# Patient Record
Sex: MALE | Race: WHITE | Employment: OTHER | URBAN - METROPOLITAN AREA
[De-identification: names, ages, dates, MRNs, and addresses within clinical notes are randomized per-mention and may not be internally consistent; named-entity substitution may affect disease eponyms.]

---

## 2017-01-05 ENCOUNTER — OFFICE VISIT (OUTPATIENT)
Dept: SURGERY | Facility: CLINIC | Age: 82
End: 2017-01-05

## 2017-01-05 VITALS — HEIGHT: 73 IN | WEIGHT: 205 LBS | BODY MASS INDEX: 27.17 KG/M2

## 2017-01-05 DIAGNOSIS — R59.9 LYMPH NODE ENLARGEMENT: ICD-10-CM

## 2017-01-05 DIAGNOSIS — L72.3 SEBACEOUS CYST OF LEFT AXILLA: Primary | ICD-10-CM

## 2017-01-05 PROCEDURE — 99024 POSTOP FOLLOW-UP VISIT: CPT | Performed by: PHYSICIAN ASSISTANT

## 2017-01-05 NOTE — PROGRESS NOTES
Follow Up Visit Note       Active Problems      1. Sebaceous cyst of left axilla    2.  Lymph node enlargement          Chief Complaint   Lymph Node    History of Present Illness  This patient is doing well following excision of 2 separate lesions within th Social History Main Topics    Smoking Status: Never Smoker                      Alcohol Use: No              Drug Use: No            Other Topics            Concern  Caffeine Concern        No  Exercise                Yes         Current Outpat and unexpected weight change. HENT: Negative for hearing loss, nosebleeds, sore throat and trouble swallowing. Respiratory: Negative for apnea, cough, shortness of breath and wheezing.     Cardiovascular: Negative for chest pain, palpitations and leg s that they should keep the area covered with a dry gauze bandage until the area has scabbed over. I have no further follow-up scheduled with this patient at this time. This patient can see me on an as-needed basis.  This patient should return urgently for

## 2017-06-06 ENCOUNTER — PRIOR ORIGINAL RECORDS (OUTPATIENT)
Dept: OTHER | Age: 82
End: 2017-06-06

## 2017-09-05 ENCOUNTER — OFFICE VISIT (OUTPATIENT)
Dept: NEPHROLOGY | Facility: CLINIC | Age: 82
End: 2017-09-05

## 2017-09-05 VITALS — SYSTOLIC BLOOD PRESSURE: 102 MMHG | DIASTOLIC BLOOD PRESSURE: 64 MMHG

## 2017-09-05 DIAGNOSIS — I10 ESSENTIAL HYPERTENSION: ICD-10-CM

## 2017-09-05 DIAGNOSIS — N28.1 BILATERAL RENAL CYSTS: Primary | ICD-10-CM

## 2017-09-05 PROCEDURE — 99203 OFFICE O/P NEW LOW 30 MIN: CPT | Performed by: INTERNAL MEDICINE

## 2017-09-05 RX ORDER — FINASTERIDE 5 MG/1
5 TABLET, FILM COATED ORAL DAILY
COMMUNITY

## 2017-09-05 RX ORDER — TORSEMIDE 20 MG/1
40 TABLET ORAL DAILY
COMMUNITY

## 2017-09-05 NOTE — PROGRESS NOTES
Consult Note      REASON FOR CONSULT:  Renal cysts         HPI:   Davie Olszewski is a 80year old male with Patient presents with:  Renal Cyst    MD Rylie Cheek was seen in the nephrology clinic today in consultation for management post stroke         PSH:  Past Surgical History:  No date: CARDIAC PACEMAKER PLACEMENT      Comment: [employer]  No date: CATARACT  08/10/2017: OTHER SURGICAL HISTORY      Comment: cystoscopy dr hernandez      Medications (Active prior to today's visit): Alcohol use:  No              Drug use: No            Other Topics            Concern  Caffeine Concern        No  Exercise                Yes           Family History:  No family history on file MALBCREACALC    Lab Results  Component Value Date   COLORUR Straw 11/24/2015   CLARITY Clear 11/24/2015   SPECGRAVITY 1.010 11/24/2015   GLUUR Negative 11/24/2015   BILUR Negative 11/24/2015   KETUR Negative 11/24/2015   BLOODURINE Negative 11/24/2015   PH

## 2018-06-05 ENCOUNTER — PRIOR ORIGINAL RECORDS (OUTPATIENT)
Dept: OTHER | Age: 83
End: 2018-06-05

## 2018-06-05 ENCOUNTER — MYAURORA ACCOUNT LINK (OUTPATIENT)
Dept: OTHER | Age: 83
End: 2018-06-05

## 2018-09-26 ENCOUNTER — MYAURORA ACCOUNT LINK (OUTPATIENT)
Dept: OTHER | Age: 83
End: 2018-09-26

## 2018-09-26 ENCOUNTER — PRIOR ORIGINAL RECORDS (OUTPATIENT)
Dept: OTHER | Age: 83
End: 2018-09-26

## 2018-09-26 LAB — INR: 0

## 2018-09-28 ENCOUNTER — PRIOR ORIGINAL RECORDS (OUTPATIENT)
Dept: OTHER | Age: 83
End: 2018-09-28

## 2018-09-28 LAB — INR: 1.3

## 2018-10-02 ENCOUNTER — PRIOR ORIGINAL RECORDS (OUTPATIENT)
Dept: OTHER | Age: 83
End: 2018-10-02

## 2018-10-02 LAB — INR: 4.3

## 2018-10-04 ENCOUNTER — PRIOR ORIGINAL RECORDS (OUTPATIENT)
Dept: OTHER | Age: 83
End: 2018-10-04

## 2018-10-04 LAB — INR: 2.9

## 2018-12-18 ENCOUNTER — MYAURORA ACCOUNT LINK (OUTPATIENT)
Dept: OTHER | Age: 83
End: 2018-12-18

## 2019-01-01 ENCOUNTER — APPOINTMENT (OUTPATIENT)
Dept: GENERAL RADIOLOGY | Facility: HOSPITAL | Age: 84
DRG: 177 | End: 2019-01-01
Attending: STUDENT IN AN ORGANIZED HEALTH CARE EDUCATION/TRAINING PROGRAM
Payer: MEDICARE

## 2019-01-01 ENCOUNTER — HOSPITAL ENCOUNTER (INPATIENT)
Facility: HOSPITAL | Age: 84
LOS: 9 days | Discharge: NURSING, REHAB OR LONG-TERM CARE | DRG: 177 | End: 2019-01-10
Attending: STUDENT IN AN ORGANIZED HEALTH CARE EDUCATION/TRAINING PROGRAM | Admitting: INTERNAL MEDICINE
Payer: MEDICARE

## 2019-01-01 ENCOUNTER — APPOINTMENT (OUTPATIENT)
Dept: GENERAL RADIOLOGY | Facility: HOSPITAL | Age: 84
DRG: 177 | End: 2019-01-01
Attending: INTERNAL MEDICINE
Payer: MEDICARE

## 2019-01-01 ENCOUNTER — EXTERNAL RECORD (OUTPATIENT)
Dept: HEALTH INFORMATION MANAGEMENT | Facility: OTHER | Age: 84
End: 2019-01-01

## 2019-01-01 DIAGNOSIS — G93.40 ENCEPHALOPATHY ACUTE: ICD-10-CM

## 2019-01-01 DIAGNOSIS — J18.9 NOSOCOMIAL PNEUMONIA: Primary | ICD-10-CM

## 2019-01-01 DIAGNOSIS — R77.8 ELEVATED TROPONIN: ICD-10-CM
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
## 2019-01-01 LAB
ADENOVIRUS PCR:: NEGATIVE
ALBUMIN SERPL-MCNC: 3 G/DL (ref 3.1–4.5)
ALBUMIN/GLOB SERPL: 0.8 {RATIO} (ref 1–2)
ALP LIVER SERPL-CCNC: 55 U/L (ref 45–117)
ALT SERPL-CCNC: 20 U/L (ref 17–63)
ANION GAP SERPL CALC-SCNC: 10 MMOL/L (ref 0–18)
APTT PPP: 28.1 SECONDS (ref 26.1–34.6)
AST SERPL-CCNC: 32 U/L (ref 15–41)
B PERT DNA SPEC QL NAA+PROBE: NEGATIVE
BAND %: 10 %
BASOPHIL % MANUAL: 0 %
BASOPHIL ABSOLUTE MANUAL: 0 X10(3) UL (ref 0–0.1)
BILIRUB SERPL-MCNC: 0.5 MG/DL (ref 0.1–2)
BILIRUB UR QL STRIP.AUTO: NEGATIVE
BUN BLD-MCNC: 26 MG/DL (ref 8–20)
BUN/CREAT SERPL: 12.2 (ref 10–20)
C PNEUM DNA SPEC QL NAA+PROBE: NEGATIVE
CALCIUM BLD-MCNC: 7.9 MG/DL (ref 8.3–10.3)
CHLORIDE SERPL-SCNC: 110 MMOL/L (ref 101–111)
CO2 SERPL-SCNC: 27 MMOL/L (ref 22–32)
CORONAVIRUS 229E PCR:: NEGATIVE
CORONAVIRUS HKU1 PCR:: NEGATIVE
CORONAVIRUS NL63 PCR:: NEGATIVE
CORONAVIRUS OC43 PCR:: NEGATIVE
CREAT BLD-MCNC: 2.13 MG/DL (ref 0.7–1.3)
EOSINOPHIL % MANUAL: 0 %
EOSINOPHIL ABSOLUTE MANUAL: 0 X10(3) UL (ref 0–0.3)
ERYTHROCYTE [DISTWIDTH] IN BLOOD BY AUTOMATED COUNT: 14.3 % (ref 11.5–16)
FLUAV RNA SPEC QL NAA+PROBE: NEGATIVE
FLUBV RNA SPEC QL NAA+PROBE: NEGATIVE
GLOBULIN PLAS-MCNC: 4 G/DL (ref 2.8–4.4)
GLUCOSE BLD-MCNC: 142 MG/DL (ref 70–99)
GLUCOSE UR STRIP.AUTO-MCNC: NEGATIVE MG/DL
HCT VFR BLD AUTO: 43.8 % (ref 37–53)
HGB BLD-MCNC: 14.1 G/DL (ref 13–17)
INR BLD: 2.58 (ref 0.9–1.1)
LACTIC ACID: 1.9 MMOL/L (ref 0.5–2)
LEUKOCYTE ESTERASE UR QL STRIP.AUTO: NEGATIVE
LYMPHOCYTE % MANUAL: 5 %
LYMPHOCYTE ABSOLUTE MANUAL: 0.76 X10(3) UL (ref 0.9–4)
M PROTEIN MFR SERPL ELPH: 7 G/DL (ref 6.4–8.2)
MCH RBC QN AUTO: 30.3 PG (ref 27–33.2)
MCHC RBC AUTO-ENTMCNC: 32.2 G/DL (ref 31–37)
MCV RBC AUTO: 94.2 FL (ref 80–99)
METAMYELOCYTE %: 1 %
METAMYELOCYTE ABSOLUTE MANUAL: 0.15 X10(3) UL (ref ?–0.01)
METAPNEUMOVIRUS PCR:: NEGATIVE
MONOCYTE % MANUAL: 8 %
MONOCYTE ABSOLUTE MANUAL: 1.22 X10(3) UL (ref 0.1–1)
MORPHOLOGY: NORMAL
MYCOPLASMA PNEUMONIA PCR:: NEGATIVE
MYELOCYTE %: 1 %
MYELOCYTE ABSOLUTE MANUAL: 0.15 X10(3) UL (ref ?–0.01)
NEUTROPHIL ABS PRELIM: 13.05 X10 (3) UL (ref 1.3–6.7)
NEUTROPHIL ABSOLUTE MANUAL: 12.92 X10(3) UL (ref 1.3–6.7)
NEUTROPHILS % MANUAL: 75 %
NITRITE UR QL STRIP.AUTO: NEGATIVE
OSMOLALITY SERPL CALC.SUM OF ELEC: 311 MOSM/KG (ref 275–295)
PARAINFLUENZA 1 PCR:: NEGATIVE
PARAINFLUENZA 2 PCR:: NEGATIVE
PARAINFLUENZA 3 PCR:: NEGATIVE
PARAINFLUENZA 4 PCR:: NEGATIVE
PH UR STRIP.AUTO: 5 [PH] (ref 4.5–8)
PLATELET # BLD AUTO: 165 10(3)UL (ref 150–450)
PLATELET MORPHOLOGY: NORMAL
POTASSIUM SERPL-SCNC: 4.4 MMOL/L (ref 3.6–5.1)
PRO-BETA NATRIURETIC PEPTIDE: 1805 PG/ML (ref ?–450)
PROT UR STRIP.AUTO-MCNC: NEGATIVE MG/DL
PSA SERPL DL<=0.01 NG/ML-MCNC: 28.5 SECONDS (ref 12.4–14.7)
RBC # BLD AUTO: 4.65 X10(6)UL (ref 3.8–5.8)
RBC UR QL AUTO: NEGATIVE
RED CELL DISTRIBUTION WIDTH-SD: 49.5 FL (ref 35.1–46.3)
RHINOVIRUS/ENTERO PCR:: NEGATIVE
RSV RNA SPEC QL NAA+PROBE: NEGATIVE
SODIUM SERPL-SCNC: 147 MMOL/L (ref 136–144)
SP GR UR STRIP.AUTO: 1.02 (ref 1–1.03)
TOTAL CELLS COUNTED: 100
TROPONIN I SERPL-MCNC: 0.05 NG/ML (ref ?–0.05)
UROBILINOGEN UR STRIP.AUTO-MCNC: <2 MG/DL
WBC # BLD AUTO: 15.2 X10(3) UL (ref 4–13)

## 2019-01-01 PROCEDURE — 93005 ELECTROCARDIOGRAM TRACING: CPT

## 2019-01-01 PROCEDURE — 71045 X-RAY EXAM CHEST 1 VIEW: CPT | Performed by: STUDENT IN AN ORGANIZED HEALTH CARE EDUCATION/TRAINING PROGRAM

## 2019-01-01 PROCEDURE — 99285 EMERGENCY DEPT VISIT HI MDM: CPT | Performed by: STUDENT IN AN ORGANIZED HEALTH CARE EDUCATION/TRAINING PROGRAM

## 2019-01-01 PROCEDURE — 84484 ASSAY OF TROPONIN QUANT: CPT | Performed by: STUDENT IN AN ORGANIZED HEALTH CARE EDUCATION/TRAINING PROGRAM

## 2019-01-01 PROCEDURE — 83880 ASSAY OF NATRIURETIC PEPTIDE: CPT | Performed by: STUDENT IN AN ORGANIZED HEALTH CARE EDUCATION/TRAINING PROGRAM

## 2019-01-01 PROCEDURE — 87581 M.PNEUMON DNA AMP PROBE: CPT | Performed by: SPECIALIST

## 2019-01-01 PROCEDURE — 81003 URINALYSIS AUTO W/O SCOPE: CPT | Performed by: STUDENT IN AN ORGANIZED HEALTH CARE EDUCATION/TRAINING PROGRAM

## 2019-01-01 PROCEDURE — 87040 BLOOD CULTURE FOR BACTERIA: CPT | Performed by: STUDENT IN AN ORGANIZED HEALTH CARE EDUCATION/TRAINING PROGRAM

## 2019-01-01 PROCEDURE — 96367 TX/PROPH/DG ADDL SEQ IV INF: CPT | Performed by: STUDENT IN AN ORGANIZED HEALTH CARE EDUCATION/TRAINING PROGRAM

## 2019-01-01 PROCEDURE — 87999 UNLISTED MICROBIOLOGY PX: CPT

## 2019-01-01 PROCEDURE — 82962 GLUCOSE BLOOD TEST: CPT

## 2019-01-01 PROCEDURE — 96365 THER/PROPH/DIAG IV INF INIT: CPT | Performed by: STUDENT IN AN ORGANIZED HEALTH CARE EDUCATION/TRAINING PROGRAM

## 2019-01-01 PROCEDURE — 87798 DETECT AGENT NOS DNA AMP: CPT | Performed by: SPECIALIST

## 2019-01-01 PROCEDURE — 85007 BL SMEAR W/DIFF WBC COUNT: CPT | Performed by: STUDENT IN AN ORGANIZED HEALTH CARE EDUCATION/TRAINING PROGRAM

## 2019-01-01 PROCEDURE — 85730 THROMBOPLASTIN TIME PARTIAL: CPT | Performed by: STUDENT IN AN ORGANIZED HEALTH CARE EDUCATION/TRAINING PROGRAM

## 2019-01-01 PROCEDURE — 83605 ASSAY OF LACTIC ACID: CPT | Performed by: STUDENT IN AN ORGANIZED HEALTH CARE EDUCATION/TRAINING PROGRAM

## 2019-01-01 PROCEDURE — 87486 CHLMYD PNEUM DNA AMP PROBE: CPT | Performed by: SPECIALIST

## 2019-01-01 PROCEDURE — 74018 RADEX ABDOMEN 1 VIEW: CPT | Performed by: INTERNAL MEDICINE

## 2019-01-01 PROCEDURE — 80053 COMPREHEN METABOLIC PANEL: CPT | Performed by: STUDENT IN AN ORGANIZED HEALTH CARE EDUCATION/TRAINING PROGRAM

## 2019-01-01 PROCEDURE — 85610 PROTHROMBIN TIME: CPT | Performed by: STUDENT IN AN ORGANIZED HEALTH CARE EDUCATION/TRAINING PROGRAM

## 2019-01-01 PROCEDURE — 85025 COMPLETE CBC W/AUTO DIFF WBC: CPT | Performed by: STUDENT IN AN ORGANIZED HEALTH CARE EDUCATION/TRAINING PROGRAM

## 2019-01-01 PROCEDURE — 36415 COLL VENOUS BLD VENIPUNCTURE: CPT | Performed by: STUDENT IN AN ORGANIZED HEALTH CARE EDUCATION/TRAINING PROGRAM

## 2019-01-01 PROCEDURE — 87633 RESP VIRUS 12-25 TARGETS: CPT | Performed by: SPECIALIST

## 2019-01-01 PROCEDURE — 94640 AIRWAY INHALATION TREATMENT: CPT

## 2019-01-01 PROCEDURE — 93010 ELECTROCARDIOGRAM REPORT: CPT | Performed by: STUDENT IN AN ORGANIZED HEALTH CARE EDUCATION/TRAINING PROGRAM

## 2019-01-01 RX ORDER — PRAVASTATIN SODIUM 20 MG
20 TABLET ORAL NIGHTLY
Status: DISCONTINUED | OUTPATIENT
Start: 2019-01-01 | End: 2019-01-10

## 2019-01-01 RX ORDER — HYDROCODONE BITARTRATE AND ACETAMINOPHEN 5; 325 MG/1; MG/1
1 TABLET ORAL EVERY 4 HOURS PRN
Status: DISCONTINUED | OUTPATIENT
Start: 2019-01-01 | End: 2019-01-10

## 2019-01-01 RX ORDER — AMLODIPINE BESYLATE 5 MG/1
5 TABLET ORAL DAILY
Status: DISCONTINUED | OUTPATIENT
Start: 2019-01-01 | End: 2019-01-10

## 2019-01-01 RX ORDER — METOPROLOL SUCCINATE 25 MG/1
25 TABLET, EXTENDED RELEASE ORAL
Status: DISCONTINUED | OUTPATIENT
Start: 2019-01-01 | End: 2019-01-10

## 2019-01-01 RX ORDER — LISINOPRIL 20 MG/1
20 TABLET ORAL DAILY
Status: DISCONTINUED | OUTPATIENT
Start: 2019-01-01 | End: 2019-01-10

## 2019-01-01 RX ORDER — ACETAMINOPHEN 325 MG/1
650 TABLET ORAL EVERY 6 HOURS PRN
Status: DISCONTINUED | OUTPATIENT
Start: 2019-01-01 | End: 2019-01-07

## 2019-01-01 RX ORDER — PEPPERMINT OIL
1 OIL (ML) MISCELLANEOUS AS NEEDED
Status: DISCONTINUED | OUTPATIENT
Start: 2019-01-01 | End: 2019-01-10

## 2019-01-01 RX ORDER — FINASTERIDE 5 MG/1
5 TABLET, FILM COATED ORAL DAILY
Status: DISCONTINUED | OUTPATIENT
Start: 2019-01-01 | End: 2019-01-10

## 2019-01-01 RX ORDER — BISACODYL 10 MG
10 SUPPOSITORY, RECTAL RECTAL
Status: DISCONTINUED | OUTPATIENT
Start: 2019-01-01 | End: 2019-01-10

## 2019-01-01 RX ORDER — ZOLPIDEM TARTRATE 5 MG/1
5 TABLET ORAL NIGHTLY PRN
Status: DISCONTINUED | OUTPATIENT
Start: 2019-01-01 | End: 2019-01-10

## 2019-01-01 RX ORDER — FUROSEMIDE 10 MG/ML
20 INJECTION INTRAMUSCULAR; INTRAVENOUS ONCE
Status: COMPLETED | OUTPATIENT
Start: 2019-01-01 | End: 2019-01-01

## 2019-01-01 RX ORDER — DEXTROSE AND SODIUM CHLORIDE 5; .45 G/100ML; G/100ML
INJECTION, SOLUTION INTRAVENOUS CONTINUOUS
Status: DISCONTINUED | OUTPATIENT
Start: 2019-01-01 | End: 2019-01-04

## 2019-01-01 RX ORDER — HYDROCODONE BITARTRATE AND ACETAMINOPHEN 5; 325 MG/1; MG/1
2 TABLET ORAL EVERY 4 HOURS PRN
Status: DISCONTINUED | OUTPATIENT
Start: 2019-01-01 | End: 2019-01-10

## 2019-01-01 RX ORDER — SODIUM PHOSPHATE, DIBASIC AND SODIUM PHOSPHATE, MONOBASIC 7; 19 G/133ML; G/133ML
1 ENEMA RECTAL ONCE AS NEEDED
Status: DISCONTINUED | OUTPATIENT
Start: 2019-01-01 | End: 2019-01-10

## 2019-01-01 RX ORDER — ONDANSETRON 2 MG/ML
4 INJECTION INTRAMUSCULAR; INTRAVENOUS EVERY 6 HOURS PRN
Status: DISCONTINUED | OUTPATIENT
Start: 2019-01-01 | End: 2019-01-10

## 2019-01-01 RX ORDER — MEMANTINE HYDROCHLORIDE 10 MG/1
10 TABLET ORAL 2 TIMES DAILY
Status: DISCONTINUED | OUTPATIENT
Start: 2019-01-01 | End: 2019-01-10

## 2019-01-01 RX ORDER — IPRATROPIUM BROMIDE AND ALBUTEROL SULFATE 2.5; .5 MG/3ML; MG/3ML
3 SOLUTION RESPIRATORY (INHALATION) EVERY 6 HOURS PRN
Status: DISCONTINUED | OUTPATIENT
Start: 2019-01-01 | End: 2019-01-10

## 2019-01-01 RX ORDER — ACETAMINOPHEN 325 MG/1
650 TABLET ORAL EVERY 4 HOURS PRN
Status: DISCONTINUED | OUTPATIENT
Start: 2019-01-01 | End: 2019-01-10

## 2019-01-01 RX ORDER — POLYETHYLENE GLYCOL 3350 17 G/17G
17 POWDER, FOR SOLUTION ORAL DAILY PRN
Status: DISCONTINUED | OUTPATIENT
Start: 2019-01-01 | End: 2019-01-10

## 2019-01-01 RX ORDER — DONEPEZIL HYDROCHLORIDE 10 MG/1
10 TABLET, FILM COATED ORAL NIGHTLY
Status: DISCONTINUED | OUTPATIENT
Start: 2019-01-01 | End: 2019-01-10

## 2019-01-01 RX ORDER — SODIUM CHLORIDE 9 MG/ML
INJECTION, SOLUTION INTRAVENOUS CONTINUOUS
Status: DISCONTINUED | OUTPATIENT
Start: 2019-01-01 | End: 2019-01-01

## 2019-01-01 RX ORDER — ASPIRIN 81 MG/1
81 TABLET ORAL DAILY
Status: DISCONTINUED | OUTPATIENT
Start: 2019-01-01 | End: 2019-01-02

## 2019-01-01 RX ORDER — CLOTRIMAZOLE 1 %
1 CREAM (GRAM) TOPICAL 2 TIMES DAILY
Status: DISCONTINUED | OUTPATIENT
Start: 2019-01-01 | End: 2019-01-10

## 2019-01-01 RX ORDER — AZITHROMYCIN 250 MG/1
500 TABLET, FILM COATED ORAL DAILY
Status: COMPLETED | OUTPATIENT
Start: 2019-01-02 | End: 2019-01-03

## 2019-01-01 RX ORDER — WARFARIN SODIUM 2.5 MG/1
2.5 TABLET ORAL NIGHTLY
Status: DISCONTINUED | OUTPATIENT
Start: 2019-01-01 | End: 2019-01-02 | Stop reason: DRUGHIGH

## 2019-01-01 RX ORDER — WARFARIN SODIUM 2.5 MG/1
2.5 TABLET ORAL NIGHTLY
COMMUNITY
End: 2021-08-16

## 2019-01-01 RX ORDER — DOCUSATE SODIUM 100 MG/1
100 CAPSULE, LIQUID FILLED ORAL 2 TIMES DAILY
Status: DISCONTINUED | OUTPATIENT
Start: 2019-01-01 | End: 2019-01-06

## 2019-01-01 RX ORDER — METOCLOPRAMIDE HYDROCHLORIDE 5 MG/ML
5 INJECTION INTRAMUSCULAR; INTRAVENOUS EVERY 8 HOURS PRN
Status: DISCONTINUED | OUTPATIENT
Start: 2019-01-01 | End: 2019-01-10

## 2019-01-01 RX ORDER — TORSEMIDE 20 MG/1
40 TABLET ORAL DAILY
Status: DISCONTINUED | OUTPATIENT
Start: 2019-01-01 | End: 2019-01-02

## 2019-01-01 RX ORDER — LEVOTHYROXINE SODIUM 0.03 MG/1
25 TABLET ORAL
Status: DISCONTINUED | OUTPATIENT
Start: 2019-01-01 | End: 2019-01-10

## 2019-01-01 RX ORDER — FLUOXETINE HYDROCHLORIDE 20 MG/1
20 CAPSULE ORAL DAILY
Status: DISCONTINUED | OUTPATIENT
Start: 2019-01-01 | End: 2019-01-02 | Stop reason: SDUPTHER

## 2019-01-01 NOTE — ED NOTES
Attempted to call report, nurse is still in shift change report, requesting we call her again in 10-15 mins.

## 2019-01-01 NOTE — CONSULTS
[facility] Note:  Renal Dose Adjustment for Metoclopramide (REGLAN)    Didier Olson has been prescribed Metoclopramide (REGLAN) 10 mg every 8 hours as needed for nausea, vomiting.     Estimated Creatinine Clearance: 30.6 mL/min (A) (based on SCr of 2

## 2019-01-01 NOTE — ED PROVIDER NOTES
Patient Seen in: [facility] Emergency Department    History   Patient presents with:  Fever (infectious)  Dyspnea ANUJA SOB (respiratory)    Stated Complaint: anuja    Patient is an 59-year-old male who presents emergency department from a nursing home [01/01/19 0449]   BP    Pulse 100   Resp 24   Temp    Temp src    SpO2 98 %   O2 Device Aerosol mask       Current:Pulse 100   Resp 24   Wt 94 kg   SpO2 98%   BMI 27.34 kg/m²         Physical Exam    Constitutional: Patient localizes to painful stimuli.   Carlos Davis for the following components:    Pro-Beta Natriuretic Peptide 1,805 (*)     All other components within normal limits   PROTHROMBIN TIME (PT) - Abnormal; Notable for the following components:    PT 28.5 (*)     INR 2.58 (*)     All other components within work-up was negative. Patient remained hemodynamically stable throughout their emergency department period of observation with no adverse events or symptoms reported by the patient.     Admission disposition: 1/1/2019  6:22 AM           Disposition and Pl

## 2019-01-01 NOTE — PROGRESS NOTES
Receieved report that pt had an elevated temp, so took the pt some tylenol, but approximately 10 minutes after taking med he threw up a large amount of emesis and also had a loose BM at the same time.  Dr Esqueda Ion present, and made pt NPO for [facility]. Pt's

## 2019-01-01 NOTE — CONSULTS
[facility]  Report of Consultation    Park Soler Patient Status:  Inpatient    1935 MRN [id number]   [facility] 2NE-A Attending Neeraj Loya MD   Hosp Day # 0 PCP Yadi Williamson MD     Reason for Consultation:  Febril Admission:  Warfarin Sodium 2.5 MG Oral Tab Take 2.5 mg by mouth nightly. Disp:  Rfl:     metoprolol succinate 12.5 mg Oral Tab Take 25 mg by mouth Daily Beta Blocker. Disp:  Rfl:     finasteride 5 MG Oral Tab Take 5 mg by mouth daily.  Disp:  Rfl:  Taking pain  Gastrointestinal: Vomiting as above with diarrhea  Musculoskeletal: Denies any pain or difficulty  Neurological: Seems connected and that he can follow some commands minimally verbal at most     All other review of systems are negative/unclear    Vit BUN 26 01/01/2019     01/01/2019    K 4.4 01/01/2019     01/01/2019    CO2 27.0 01/01/2019     01/01/2019    CA 7.9 01/01/2019    ALB 3.0 01/01/2019    ALKPHO 55 01/01/2019    BILT 0.5 01/01/2019    TP 7.0 01/01/2019    AST 32 01/01/2019

## 2019-01-01 NOTE — CERTIFICATION
Initial Certification      PHYSICIAN Certification of Need for Inpatient Hospitalization - Initial Certification    Patient will require inpatient services that will reasonably be expected to span two midnight's based on the clinical documentation in H+P.

## 2019-01-01 NOTE — ED INITIAL ASSESSMENT (HPI)
Pt arrived via ems, called for fever of 102, tylenol 650mg given. From [place]. +diaphoretic. A/O x 1 (norm). Hx of dementia and CVA.

## 2019-01-01 NOTE — H&P
[facility]    PATIENT'S NAME: Jennifer Reilly   ATTENDING PHYSICIAN: Inderijt Dill M.D.    PATIENT ACCOUNT#:   [de-identified]    LOCATION:  [address] [place] [address]  MEDICAL RECORD #:   [id number]       YOB: 1935  ADMISSION DATE:       01/01/2 Hypertensive heart disease. 6.   Permanent pacemaker. 7.   History of seizure disorder and stroke. PLAN:  Nebulizer treatments, IV antibiotics, diuresis, follow up labs, 2D echo. He is a DNR. He is also on Coumadin.     Dictated By Diana Rosales

## 2019-01-02 ENCOUNTER — APPOINTMENT (OUTPATIENT)
Dept: GENERAL RADIOLOGY | Facility: HOSPITAL | Age: 84
DRG: 177 | End: 2019-01-02
Attending: INTERNAL MEDICINE
Payer: MEDICARE

## 2019-01-02 ENCOUNTER — APPOINTMENT (OUTPATIENT)
Dept: CV DIAGNOSTICS | Facility: HOSPITAL | Age: 84
DRG: 177 | End: 2019-01-02
Attending: SPECIALIST
Payer: MEDICARE

## 2019-01-02 PROBLEM — I50.9 CHF (CONGESTIVE HEART FAILURE) (HCC): Status: ACTIVE | Noted: 2019-01-02

## 2019-01-02 PROBLEM — R19.7 VOMITING AND DIARRHEA: Status: ACTIVE | Noted: 2019-01-02

## 2019-01-02 PROBLEM — R11.10 VOMITING AND DIARRHEA: Status: ACTIVE | Noted: 2019-01-02

## 2019-01-02 LAB
ALBUMIN SERPL-MCNC: 2.6 G/DL (ref 3.1–4.5)
ALBUMIN/GLOB SERPL: 0.6 {RATIO} (ref 1–2)
ALP LIVER SERPL-CCNC: 50 U/L (ref 45–117)
ALT SERPL-CCNC: 19 U/L (ref 17–63)
ANION GAP SERPL CALC-SCNC: 7 MMOL/L (ref 0–18)
AST SERPL-CCNC: 29 U/L (ref 15–41)
ATRIAL RATE: 268 BPM
ATRIAL RATE: 68 BPM
BASOPHILS # BLD AUTO: 0.02 X10(3) UL (ref 0–0.1)
BASOPHILS NFR BLD AUTO: 0.2 %
BILIRUB SERPL-MCNC: 0.5 MG/DL (ref 0.1–2)
BNP SERPL-MCNC: 99 PG/ML (ref 2–99)
BUN BLD-MCNC: 31 MG/DL (ref 8–20)
BUN/CREAT SERPL: 17.1 (ref 10–20)
CALCIUM BLD-MCNC: 7.9 MG/DL (ref 8.3–10.3)
CHLORIDE SERPL-SCNC: 113 MMOL/L (ref 101–111)
CO2 SERPL-SCNC: 28 MMOL/L (ref 22–32)
CREAT BLD-MCNC: 1.81 MG/DL (ref 0.7–1.3)
EOSINOPHIL # BLD AUTO: 0.03 X10(3) UL (ref 0–0.3)
EOSINOPHIL NFR BLD AUTO: 0.3 %
ERYTHROCYTE [DISTWIDTH] IN BLOOD BY AUTOMATED COUNT: 14.6 % (ref 11.5–16)
GLOBULIN PLAS-MCNC: 4.1 G/DL (ref 2.8–4.4)
GLUCOSE BLD-MCNC: 106 MG/DL (ref 70–99)
GLUCOSE BLD-MCNC: 152 MG/DL (ref 65–99)
HAV IGM SER QL: 2 MG/DL (ref 1.8–2.5)
HCT VFR BLD AUTO: 40.4 % (ref 37–53)
HGB BLD-MCNC: 12.6 G/DL (ref 13–17)
IMMATURE GRANULOCYTE COUNT: 0.02 X10(3) UL (ref 0–1)
IMMATURE GRANULOCYTE RATIO %: 0.2 %
INR BLD: 3.74 (ref 0.9–1.1)
LYMPHOCYTES # BLD AUTO: 1.89 X10(3) UL (ref 0.9–4)
LYMPHOCYTES NFR BLD AUTO: 17.6 %
M PROTEIN MFR SERPL ELPH: 6.7 G/DL (ref 6.4–8.2)
MCH RBC QN AUTO: 29.6 PG (ref 27–33.2)
MCHC RBC AUTO-ENTMCNC: 31.2 G/DL (ref 31–37)
MCV RBC AUTO: 94.8 FL (ref 80–99)
MONOCYTES # BLD AUTO: 0.89 X10(3) UL (ref 0.1–1)
MONOCYTES NFR BLD AUTO: 8.3 %
NEUTROPHIL ABS PRELIM: 7.87 X10 (3) UL (ref 1.3–6.7)
NEUTROPHILS # BLD AUTO: 7.87 X10(3) UL (ref 1.3–6.7)
NEUTROPHILS NFR BLD AUTO: 73.4 %
OSMOLALITY SERPL CALC.SUM OF ELEC: 313 MOSM/KG (ref 275–295)
P AXIS: 40 DEGREES
PHOSPHATE SERPL-MCNC: 3.7 MG/DL (ref 2.5–4.9)
PLATELET # BLD AUTO: 153 10(3)UL (ref 150–450)
POTASSIUM SERPL-SCNC: 3.3 MMOL/L (ref 3.6–5.1)
POTASSIUM SERPL-SCNC: 3.4 MMOL/L (ref 3.6–5.1)
PSA SERPL DL<=0.01 NG/ML-MCNC: 38.1 SECONDS (ref 12.4–14.7)
Q-T INTERVAL: 468 MS
Q-T INTERVAL: 480 MS
QRS DURATION: 144 MS
QRS DURATION: 152 MS
QTC CALCULATION (BEZET): 497 MS
QTC CALCULATION (BEZET): 507 MS
R AXIS: 193 DEGREES
R AXIS: 217 DEGREES
RBC # BLD AUTO: 4.26 X10(6)UL (ref 3.8–5.8)
RED CELL DISTRIBUTION WIDTH-SD: 51 FL (ref 35.1–46.3)
SODIUM SERPL-SCNC: 148 MMOL/L (ref 136–144)
T AXIS: 170 DEGREES
T AXIS: 58 DEGREES
TSI SER-ACNC: 1.57 MIU/ML (ref 0.35–5.5)
VENTRICULAR RATE: 67 BPM
VENTRICULAR RATE: 68 BPM
WBC # BLD AUTO: 10.7 X10(3) UL (ref 4–13)

## 2019-01-02 PROCEDURE — 83735 ASSAY OF MAGNESIUM: CPT | Performed by: INTERNAL MEDICINE

## 2019-01-02 PROCEDURE — 93306 TTE W/DOPPLER COMPLETE: CPT | Performed by: SPECIALIST

## 2019-01-02 PROCEDURE — 85025 COMPLETE CBC W/AUTO DIFF WBC: CPT | Performed by: SPECIALIST

## 2019-01-02 PROCEDURE — 92610 EVALUATE SWALLOWING FUNCTION: CPT

## 2019-01-02 PROCEDURE — 85610 PROTHROMBIN TIME: CPT | Performed by: SPECIALIST

## 2019-01-02 PROCEDURE — 71045 X-RAY EXAM CHEST 1 VIEW: CPT | Performed by: INTERNAL MEDICINE

## 2019-01-02 PROCEDURE — 83880 ASSAY OF NATRIURETIC PEPTIDE: CPT | Performed by: SPECIALIST

## 2019-01-02 PROCEDURE — 84100 ASSAY OF PHOSPHORUS: CPT | Performed by: INTERNAL MEDICINE

## 2019-01-02 PROCEDURE — 84132 ASSAY OF SERUM POTASSIUM: CPT | Performed by: SPECIALIST

## 2019-01-02 PROCEDURE — 80053 COMPREHEN METABOLIC PANEL: CPT | Performed by: INTERNAL MEDICINE

## 2019-01-02 PROCEDURE — 84443 ASSAY THYROID STIM HORMONE: CPT | Performed by: SPECIALIST

## 2019-01-02 RX ORDER — POTASSIUM CHLORIDE 20 MEQ/1
20 TABLET, EXTENDED RELEASE ORAL DAILY
COMMUNITY

## 2019-01-02 RX ORDER — FLUOXETINE 10 MG/1
10 TABLET, FILM COATED ORAL DAILY
Status: DISCONTINUED | OUTPATIENT
Start: 2019-01-03 | End: 2019-01-10

## 2019-01-02 RX ORDER — TORSEMIDE 20 MG/1
20 TABLET ORAL DAILY
Status: DISCONTINUED | OUTPATIENT
Start: 2019-01-03 | End: 2019-01-06

## 2019-01-02 RX ORDER — METOPROLOL SUCCINATE 25 MG/1
25 TABLET, EXTENDED RELEASE ORAL DAILY
COMMUNITY

## 2019-01-02 RX ORDER — ACETAMINOPHEN 325 MG/1
650 TABLET ORAL EVERY 6 HOURS PRN
COMMUNITY

## 2019-01-02 RX ORDER — POTASSIUM CHLORIDE 20 MEQ/1
40 TABLET, EXTENDED RELEASE ORAL EVERY 4 HOURS
Status: DISPENSED | OUTPATIENT
Start: 2019-01-02 | End: 2019-01-02

## 2019-01-02 RX ORDER — FLUOXETINE 10 MG/1
10 CAPSULE ORAL DAILY
COMMUNITY
End: 2021-08-16

## 2019-01-02 RX ORDER — MULTIVITAMIN WITH FOLIC ACID 400 MCG
1 TABLET ORAL DAILY
COMMUNITY

## 2019-01-02 NOTE — CONSULTS
[facility] Dosing Service  Warfarin (Coumadin) Initial Dosing    aL Farr is a 80year old male for whom pharmacy has been consulted to dose warfarin (COUMADIN) for Afib/aflutter  by Dr. Verona Jules. Based on this indication, goal INR is 2-3.     Serge Nicholson

## 2019-01-02 NOTE — PLAN OF CARE
Altered Communication/Language Barrier    • Patient/Family is able to understand and participate in their care Progressing        DISCHARGE PLANNING    • Discharge to home or other facility with appropriate resources Progressing        Impaired Swallowing

## 2019-01-02 NOTE — PROGRESS NOTES
[facility]  Progress Note    Ale Dupree Patient Status:  Inpatient    1935 MRN [id number]   [facility] 2NE-A Attending Arian Reddy MD   Hosp Day # 1 PCP Carey العراقي MD         SUBJECTIVE:  Subjective:  Ale Johnson 3. 0*  2.6*       Recent Labs   Lab  01/01/19   0442   PGLU  152*                   Meds:     • Potassium Chloride ER  40 mEq Oral Q4H   • [START ON 1/3/2019] FLUoxetine  10 mg Oral Daily   • [START ON 1/3/2019] torsemide  20 mg Oral Daily   • AmLODIPine Be

## 2019-01-02 NOTE — PROGRESS NOTES
[facility]  Progress Note    Laurent Client Joon Patient Status:  Inpatient    1935 MRN [id number]   [facility] 2NE-A Attending Deepak Ramirez MD   Hosp Day # 1 PCP Pooja Merida MD        Impression     # Febrile illness wit 0345  Gross per 24 hour   Intake 920 ml   Output 0 ml   Net 920 ml     Wt Readings from Last 6 Encounters:  01/02/19 : 214 lb 11.7 oz (97.4 kg)  01/05/17 : 205 lb (93 kg)  12/07/16 : 212 lb (96.2 kg)  10/31/16 : 205 lb (93 kg)  11/24/15 : 180 lb (81.6 kg) (PRINIVIL,ZESTRIL) tab 20 mg 20 mg Oral Daily   Memantine HCl (NAMENDA) tab 10 mg 10 mg Oral BID   Metoprolol Succinate ER (Toprol XL) 24 hr tab 25 mg 25 mg Oral Daily Beta Blocker   Pravastatin Sodium (PRAVACHOL) tab 20 mg 20 mg Oral Nightly   clotrimazol

## 2019-01-02 NOTE — PLAN OF CARE
Alert and confused. Kept NPO except for meds. On 6L HFNC with crackles/ exp wheezes. Vpaced on monitor with underlying aflutter. 1 BM this shift. No episodes of vomiting. Pt safety maintained, will continue to monitor.        Altered Communication/Languag

## 2019-01-02 NOTE — SLP NOTE
Order received for bedside swallow evaluation. Attempted to see patient however toileting needs required at this time. Will re-attempt as available and appropriate.     Sparkle Renteria MA, [phone] [facility]  Pager

## 2019-01-02 NOTE — CM/SW NOTE
01/02/19 1700   CM/SW Screening   Referral Source Social Work (self-referral)   Information Source Chart review;Nursing rounds   Patient's Mental Status Alert;Memory Impairments   Patient's [address] [facility] Name Angelia Pratt

## 2019-01-02 NOTE — SLP NOTE
ADULT SWALLOWING EVALUATION    ASSESSMENT    ASSESSMENT/OVERALL IMPRESSION:  Order received for bedside swallow evaluation.  Pt is an 79 y/o male who presented from nursing home with high fever and SOB; possible PNA with acute encephalopathy; recent episode posted at bedside. Will continue to follow.           RECOMMENDATIONS   Diet Recommendations - Solids: Mechanical soft chopped  Diet Recommendations - Liquid: Nectar thick   Video swallow study in the am                          Compensatory Strategies Mauricio cardiomegaly with pulmonary vascular congestion. Stable small left pleural effusion. Left chest pacemaker again noted. Calcified plaque in the thoracic aorta. No pneumothorax. Surgical clips along the left axillary region.   SUBJECTIVE       OBJECTIV

## 2019-01-03 ENCOUNTER — APPOINTMENT (OUTPATIENT)
Dept: GENERAL RADIOLOGY | Facility: HOSPITAL | Age: 84
DRG: 177 | End: 2019-01-03
Attending: SPECIALIST
Payer: MEDICARE

## 2019-01-03 LAB
ALBUMIN SERPL-MCNC: 2.6 G/DL (ref 3.1–4.5)
ALBUMIN/GLOB SERPL: 0.6 {RATIO} (ref 1–2)
ALP LIVER SERPL-CCNC: 48 U/L (ref 45–117)
ALT SERPL-CCNC: 20 U/L (ref 17–63)
ANION GAP SERPL CALC-SCNC: 3 MMOL/L (ref 0–18)
AST SERPL-CCNC: 30 U/L (ref 15–41)
BILIRUB SERPL-MCNC: 0.4 MG/DL (ref 0.1–2)
BUN BLD-MCNC: 28 MG/DL (ref 8–20)
BUN/CREAT SERPL: 18.7 (ref 10–20)
CALCIUM BLD-MCNC: 8.1 MG/DL (ref 8.3–10.3)
CHLORIDE SERPL-SCNC: 111 MMOL/L (ref 101–111)
CO2 SERPL-SCNC: 33 MMOL/L (ref 22–32)
CREAT BLD-MCNC: 1.5 MG/DL (ref 0.7–1.3)
GLOBULIN PLAS-MCNC: 4.2 G/DL (ref 2.8–4.4)
GLUCOSE BLD-MCNC: 114 MG/DL (ref 70–99)
INR BLD: 3.74 (ref 0.9–1.1)
M PROTEIN MFR SERPL ELPH: 6.8 G/DL (ref 6.4–8.2)
OSMOLALITY SERPL CALC.SUM OF ELEC: 310 MOSM/KG (ref 275–295)
POTASSIUM SERPL-SCNC: 3.3 MMOL/L (ref 3.6–5.1)
POTASSIUM SERPL-SCNC: 3.3 MMOL/L (ref 3.6–5.1)
POTASSIUM SERPL-SCNC: 3.8 MMOL/L (ref 3.6–5.1)
PSA SERPL DL<=0.01 NG/ML-MCNC: 38.1 SECONDS (ref 12.4–14.7)
SODIUM SERPL-SCNC: 147 MMOL/L (ref 136–144)

## 2019-01-03 PROCEDURE — 92611 MOTION FLUOROSCOPY/SWALLOW: CPT

## 2019-01-03 PROCEDURE — 97530 THERAPEUTIC ACTIVITIES: CPT

## 2019-01-03 PROCEDURE — 71045 X-RAY EXAM CHEST 1 VIEW: CPT | Performed by: SPECIALIST

## 2019-01-03 PROCEDURE — 97167 OT EVAL HIGH COMPLEX 60 MIN: CPT

## 2019-01-03 PROCEDURE — 74230 X-RAY XM SWLNG FUNCJ C+: CPT | Performed by: SPECIALIST

## 2019-01-03 PROCEDURE — 97535 SELF CARE MNGMENT TRAINING: CPT

## 2019-01-03 PROCEDURE — 84132 ASSAY OF SERUM POTASSIUM: CPT | Performed by: SPECIALIST

## 2019-01-03 PROCEDURE — 97116 GAIT TRAINING THERAPY: CPT

## 2019-01-03 PROCEDURE — 85610 PROTHROMBIN TIME: CPT | Performed by: SPECIALIST

## 2019-01-03 PROCEDURE — 97110 THERAPEUTIC EXERCISES: CPT

## 2019-01-03 PROCEDURE — 97162 PT EVAL MOD COMPLEX 30 MIN: CPT

## 2019-01-03 PROCEDURE — 80053 COMPREHEN METABOLIC PANEL: CPT | Performed by: INTERNAL MEDICINE

## 2019-01-03 RX ORDER — POTASSIUM CHLORIDE 20 MEQ/1
40 TABLET, EXTENDED RELEASE ORAL EVERY 4 HOURS
Status: COMPLETED | OUTPATIENT
Start: 2019-01-03 | End: 2019-01-03

## 2019-01-03 RX ORDER — POTASSIUM CHLORIDE 20 MEQ/1
40 TABLET, EXTENDED RELEASE ORAL ONCE
Status: COMPLETED | OUTPATIENT
Start: 2019-01-03 | End: 2019-01-03

## 2019-01-03 RX ORDER — FUROSEMIDE 10 MG/ML
INJECTION INTRAMUSCULAR; INTRAVENOUS
Status: COMPLETED
Start: 2019-01-03 | End: 2019-01-03

## 2019-01-03 RX ORDER — FUROSEMIDE 10 MG/ML
40 INJECTION INTRAMUSCULAR; INTRAVENOUS ONCE
Status: COMPLETED | OUTPATIENT
Start: 2019-01-03 | End: 2019-01-03

## 2019-01-03 NOTE — SLP NOTE
ADULT VIDEOFLUOROSCOPIC SWALLOWING STUDY    Admission Date: 1/1/2019  Evaluation Date: 01/03/19  Radiologist: Dr. Flo Cuevas: Mechanical soft chopped(downgrade to pureed if any difficulty)  Diet Recommendatio Positioned: Upright;[facility] chair. Patient Viewed: Lateral.  Patient Alertness: Fully alert; Constant cues requied to stay on task. Consistencies Presented: Thin liquids; Nectar thick liquids;Puree; Soft solid to assess oropharyngeal swallow function and assess ineffective  Laryngeal Penetration: None  Tracheal Aspiration: None  SOFT SOLID  Oral Phase of Swallow (VFSS - Soft Solid): Impaired  Bolus Retrieval (VFSS - Soft Solid): Intact  Bilabial Seal (VFSS - Soft Solid):  Intact  Bolus Formation (VFSS - Soft Solid exam, diet recommendations and plan in layman's terms with patient however question his level of understanding given cognitive deficits. Informed RN via phone. Will continue to follow.                 GOALS  Goal #1 The patient will tolerate mechanical soft

## 2019-01-03 NOTE — PLAN OF CARE
Problem: PAIN - ADULT  Goal: Verbalizes/displays adequate comfort level or patient's stated pain goal  INTERVENTIONS:  - Encourage pt to monitor pain and request assistance  - Assess pain using appropriate pain scale  - Administer analgesics based on type as appropriate  - Identify discharge learning needs (meds, wound care, etc)  - Arrange for interpreters to assist at discharge as needed  - Consider post-discharge preferences of patient/family/discharge partner  - Complete POLST form as appropriate  - Ass Promote sitting position while performing ADLs such as feeding, grooming, and bathing  - Educate and encourage patient/family in tolerated functional activity level and precautions during self-care    Outcome: Progressing  Needs assistance with ADl's, orde

## 2019-01-03 NOTE — PHYSICAL THERAPY NOTE
Attempted to see patient for PT evaluation. Per RN, pt off floor for video swallow. Will follow-up as appropriate.

## 2019-01-03 NOTE — CONSULTS
Pharmacy Dosing Service: Warfarin (Coumadin)  Carina Luciano is a 80year old male for whom pharmacy has been dosing warfarin (Coumadin).  Goal INR is 2-3    Recent Labs   Lab  01/01/19   0504  01/02/19   0550  01/03/19   0554   INR  2.58*  3.74*  3.74*

## 2019-01-03 NOTE — PHYSICAL THERAPY NOTE
PHYSICAL THERAPY EVALUATION - INPATIENT     Room Number: 7232/1845-U  Evaluation Date: 1/3/2019  Type of Evaluation: Initial  Physician Order: PT Eval and Treat    Presenting Problem: fever, possible pneumonia  Reason for Therapy: Mobility Dysfunctio (Comment)(wheelchair)  Patient Regularly Uses: Glasses    Prior Level of Vantage: Pt with difficulty verbalizing prior level of function.  [facility] staff contacted to obtain further information; pt staff, pt requires supervision/asst for transfers f wheelchair)?: A Lot   -   Need to walk in hospital room?: A Little   -   Climbing 3-5 steps with a railing?: Total       AM-PAC Score:  Raw Score: 13   Approx Degree of Impairment: 64.91%   Standardized Score (AM-PAC Scale): 36.74   CMS Modifier (G-Code): therapy include Macular degeneration, HTN, CVA (2007, 2011), seizure disorder, dementia, SDH (2015), depression, congestive heart disease, pacemaker placement.   In this PT evaluation, the patient presents with the following impairments decreased activity t

## 2019-01-03 NOTE — OCCUPATIONAL THERAPY NOTE
OCCUPATIONAL THERAPY QUICK EVALUATION - INPATIENT    Room Number: 0537/2170-S  Evaluation Date: 1/3/2019     Type of Evaluation: Quick Eval  Presenting Problem: Nosocomial Pnuemonia    Physician Order: IP Consult to Occupational Therapy  Reason for Therapy 4-2011- right sided weakness   • Syncope    • Thyroid disease    • Unspecified essential hypertension    • Visual impairment     macular degeneration right eye post stroke       Past Surgical History  Past Surgical History:   Procedure Laterality Date   • Opposition: Symmetrical       ACTIVITY TOLERANCE    O2 SATURATIONS        SPO2 Ambulation on Oxygen: 98  Ambulation oxygen flow (liters per minute): 2    ACTIVITIES OF DAILY LIVING ASSESSMENT  AM-PAC ‘6-Clicks’ Inpatient Daily Activity Short Form   How muc male admitted on 1/1/2019 for Encephalopathy acute [G93.40] Nosocomial pneumonia [J18.9]  Elevated troponin [R74.8]. Complete medical history and occupational profile noted above. Pt presents above baseline related to IP OT skills/tasks.  Pt DC from IP O T

## 2019-01-03 NOTE — PROGRESS NOTES
[facility]  Progress Note    Gerardo Dupree Patient Status:  Inpatient    1935 MRN [id number]   [facility] 2NE-A Attending Jason Hartman MD   Hosp Day # 2 PCP Chris Rangel MD        Impression     # Febrile illness wit (Last 24 hours) at 1/3/2019 1626  Last data filed at 1/3/2019 1254  Gross per 24 hour   Intake 240 ml   Output —   Net 240 ml     Wt Readings from Last 6 Encounters:  01/03/19 : 213 lb (96.6 kg)  01/05/17 : 205 lb (93 kg)  12/07/16 : 212 lb (96.2 kg)  10/3 HCl (PROZAC) tab 10 mg 10 mg Oral Daily   torsemide (DEMADEX) tab 20 mg 20 mg Oral Daily   AmLODIPine Besylate (NORVASC) tab 5 mg 5 mg Oral Daily   Donepezil HCl (ARICEPT) tab 10 mg 10 mg Oral Nightly   finasteride (PROSCAR) tab 5 mg 5 mg Oral Daily   Levo

## 2019-01-03 NOTE — PROGRESS NOTES
[facility]  Progress Note    [place] Joon Patient Status:  Inpatient    1935 MRN [id number]   [facility] 2NE-A Attending Jamie Cosme MD   Hosp Day # 2 PCP Clarke Roberts MD         SUBJECTIVE:  Subjective:  Alexey Spray All CA  7.9*  7.9*   --   8.1*   MG   --   2.0   --    --    PHOS   --   3.7   --    --    GLU  142*  106*   --   114*       Recent Labs   Lab  01/01/19   0504  01/02/19   0550  01/03/19   0554   ALT  20  19  20   AST  32  29  30   ALB  3.0*  2.6*  2.6*

## 2019-01-03 NOTE — OCCUPATIONAL THERAPY NOTE
IP OT attempt to see patient for therapeutic assessment/treatment. RN states that patient is currently off floor for a video swallow. Will attempt again as able.     Roxann Chowdhury, MOT, OTR/L  Master of Occupational Therapy  Occupational Therapist, [facility]

## 2019-01-04 LAB
ALLENS TEST: POSITIVE
ARTERIAL BLD GAS O2 SATURATION: 94 % (ref 92–100)
ARTERIAL BLOOD GAS BASE EXCESS: 2.3
ARTERIAL BLOOD GAS HCO3: 27.5 MEQ/L (ref 22–26)
ARTERIAL BLOOD GAS PCO2: 44 MM HG (ref 35–45)
ARTERIAL BLOOD GAS PH: 7.41 (ref 7.35–7.45)
ARTERIAL BLOOD GAS PO2: 77 MM HG (ref 80–105)
CALCULATED O2 SATURATION: 96 % (ref 92–100)
CARBOXYHEMOGLOBIN: 1.2 % SAT (ref 0–3)
INR BLD: 3.55 (ref 0.9–1.1)
L/M: 2 L/MIN
METHEMOGLOBIN: 0.6 % SAT (ref 0.4–1.5)
PATIENT TEMPERATURE: 97.7 F
POTASSIUM SERPL-SCNC: 3.7 MMOL/L (ref 3.6–5.1)
PSA SERPL DL<=0.01 NG/ML-MCNC: 36.6 SECONDS (ref 12.4–14.7)
TOTAL HEMOGLOBIN: 13.5 G/DL (ref 12.6–17.4)

## 2019-01-04 PROCEDURE — 36600 WITHDRAWAL OF ARTERIAL BLOOD: CPT | Performed by: INTERNAL MEDICINE

## 2019-01-04 PROCEDURE — 82375 ASSAY CARBOXYHB QUANT: CPT | Performed by: INTERNAL MEDICINE

## 2019-01-04 PROCEDURE — 94640 AIRWAY INHALATION TREATMENT: CPT

## 2019-01-04 PROCEDURE — 85018 HEMOGLOBIN: CPT | Performed by: INTERNAL MEDICINE

## 2019-01-04 PROCEDURE — 82803 BLOOD GASES ANY COMBINATION: CPT | Performed by: INTERNAL MEDICINE

## 2019-01-04 PROCEDURE — 85610 PROTHROMBIN TIME: CPT | Performed by: SPECIALIST

## 2019-01-04 PROCEDURE — 83050 HGB METHEMOGLOBIN QUAN: CPT | Performed by: INTERNAL MEDICINE

## 2019-01-04 PROCEDURE — 92526 ORAL FUNCTION THERAPY: CPT

## 2019-01-04 PROCEDURE — 84132 ASSAY OF SERUM POTASSIUM: CPT | Performed by: SPECIALIST

## 2019-01-04 RX ORDER — IPRATROPIUM BROMIDE AND ALBUTEROL SULFATE 2.5; .5 MG/3ML; MG/3ML
3 SOLUTION RESPIRATORY (INHALATION)
Status: DISCONTINUED | OUTPATIENT
Start: 2019-01-04 | End: 2019-01-10

## 2019-01-04 RX ORDER — WARFARIN SODIUM 1 MG/1
1 TABLET ORAL
Status: DISCONTINUED | OUTPATIENT
Start: 2019-01-04 | End: 2019-01-05 | Stop reason: DRUGHIGH

## 2019-01-04 RX ORDER — POTASSIUM CHLORIDE 20 MEQ/1
40 TABLET, EXTENDED RELEASE ORAL ONCE
Status: COMPLETED | OUTPATIENT
Start: 2019-01-04 | End: 2019-01-04

## 2019-01-04 RX ORDER — DEXTROSE MONOHYDRATE 50 MG/ML
INJECTION, SOLUTION INTRAVENOUS CONTINUOUS
Status: DISCONTINUED | OUTPATIENT
Start: 2019-01-04 | End: 2019-01-08

## 2019-01-04 RX ORDER — FUROSEMIDE 10 MG/ML
60 INJECTION INTRAMUSCULAR; INTRAVENOUS ONCE
Status: COMPLETED | OUTPATIENT
Start: 2019-01-04 | End: 2019-01-04

## 2019-01-04 NOTE — PROGRESS NOTES
[facility]  Progress Note    Julian Kawasaki Allrich Patient Status:  Inpatient    1935 MRN [id number]   [facility] 2NE-A Attending Jerry Sykes MD   Hosp Day # 3 PCP Cassidy Aponte MD     Subjective:  Chloe Oral is a(n) 80 y Recent Labs   Lab  01/01/19   0504  01/02/19   0550   01/03/19   0554  01/03/19   1449  01/04/19   0606   GLU  142*  106*   --   114*   --    --    BUN  26*  31*   --   28*   --    --    CREATSERUM  2.13*  1.81*   --   1.50*   --    --    GFRAA  32*  3

## 2019-01-04 NOTE — PLAN OF CARE
Problem: PAIN - ADULT  Goal: Verbalizes/displays adequate comfort level or patient's stated pain goal  INTERVENTIONS:  - Encourage pt to monitor pain and request assistance  - Assess pain using appropriate pain scale  - Administer analgesics based on type planning  - Arrange for needed discharge resources and transportation as appropriate  - Identify discharge learning needs (meds, wound care, etc)  - Arrange for interpreters to assist at discharge as needed  - Consider post-discharge preferences of patient encourage patient to participate in ADLs to maximize function  - Promote sitting position while performing ADLs such as feeding, grooming, and bathing  - Educate and encourage patient/family in tolerated functional activity level and precautions during sara

## 2019-01-04 NOTE — SLP NOTE
SPEECH DAILY NOTE - INPATIENT    ASSESSMENT & PLAN   ASSESSMENT  Pt seen for dysphagia tx to assess tolerance with recommended diet, ensure appropriate utilization of aspiration precautions and provide pt/family education.  Pt found lying down in bed; alert patient/family/caregiver will demonstrate understanding and implementation of aspiration precautions and swallow strategies independently over 2 session(s).      In progress   Goal #3 The patient will utilize compensatory strategies as outlined by  VFSS inc

## 2019-01-04 NOTE — CONSULTS
Pharmacy Dosing Service: Warfarin (Coumadin)  Moriah Wei is a 80year old male for whom pharmacy has been dosing warfarin (Coumadin).  Goal INR is 2-3    Recent Labs   Lab  01/01/19   0504  01/02/19   0550  01/03/19   0554  01/04/19   0841   INR  2.5

## 2019-01-04 NOTE — PROGRESS NOTES
[facility]  Progress Note    Krystian Dupree Patient Status:  Inpatient    1935 MRN [id number]   [facility] 2NE-A Attending Lawson Avelar MD   Hosp Day # 3 PCP Lin Cuellar MD         SUBJECTIVE:  Subjective:  Krystian Johnson 31*   --   28*   --    --    CREATSERUM  2.13*  1.81*   --   1.50*   --    --    CA  7.9*  7.9*   --   8.1*   --    --    MG   --   2.0   --    --    --    --    PHOS   --   3.7   --    --    --    --    GLU  142*  106*   --   114*   --    --        Recent

## 2019-01-05 ENCOUNTER — APPOINTMENT (OUTPATIENT)
Dept: GENERAL RADIOLOGY | Facility: HOSPITAL | Age: 84
DRG: 177 | End: 2019-01-05
Attending: INTERNAL MEDICINE
Payer: MEDICARE

## 2019-01-05 LAB
ANION GAP SERPL CALC-SCNC: 5 MMOL/L (ref 0–18)
BASOPHILS # BLD AUTO: 0.03 X10(3) UL (ref 0–0.1)
BASOPHILS NFR BLD AUTO: 0.3 %
BUN BLD-MCNC: 18 MG/DL (ref 8–20)
BUN/CREAT SERPL: 14.2 (ref 10–20)
CALCIUM BLD-MCNC: 7.7 MG/DL (ref 8.3–10.3)
CHLORIDE SERPL-SCNC: 106 MMOL/L (ref 101–111)
CO2 SERPL-SCNC: 32 MMOL/L (ref 22–32)
CREAT BLD-MCNC: 1.27 MG/DL (ref 0.7–1.3)
EOSINOPHIL # BLD AUTO: 0.32 X10(3) UL (ref 0–0.3)
EOSINOPHIL NFR BLD AUTO: 3.2 %
ERYTHROCYTE [DISTWIDTH] IN BLOOD BY AUTOMATED COUNT: 13.5 % (ref 11.5–16)
GLUCOSE BLD-MCNC: 121 MG/DL (ref 70–99)
HAV IGM SER QL: 1.7 MG/DL (ref 1.8–2.5)
HCT VFR BLD AUTO: 37 % (ref 37–53)
HGB BLD-MCNC: 11.8 G/DL (ref 13–17)
IMMATURE GRANULOCYTE COUNT: 0.05 X10(3) UL (ref 0–1)
IMMATURE GRANULOCYTE RATIO %: 0.5 %
INR BLD: 3.07 (ref 0.9–1.1)
LYMPHOCYTES # BLD AUTO: 1.89 X10(3) UL (ref 0.9–4)
LYMPHOCYTES NFR BLD AUTO: 18.8 %
MCH RBC QN AUTO: 30.1 PG (ref 27–33.2)
MCHC RBC AUTO-ENTMCNC: 31.9 G/DL (ref 31–37)
MCV RBC AUTO: 94.4 FL (ref 80–99)
MONOCYTES # BLD AUTO: 0.79 X10(3) UL (ref 0.1–1)
MONOCYTES NFR BLD AUTO: 7.8 %
NEUTROPHIL ABS PRELIM: 7 X10 (3) UL (ref 1.3–6.7)
NEUTROPHILS # BLD AUTO: 7 X10(3) UL (ref 1.3–6.7)
NEUTROPHILS NFR BLD AUTO: 69.4 %
OSMOLALITY SERPL CALC.SUM OF ELEC: 299 MOSM/KG (ref 275–295)
PLATELET # BLD AUTO: 171 10(3)UL (ref 150–450)
POTASSIUM SERPL-SCNC: 3 MMOL/L (ref 3.6–5.1)
POTASSIUM SERPL-SCNC: 3.7 MMOL/L (ref 3.6–5.1)
PSA SERPL DL<=0.01 NG/ML-MCNC: 32.7 SECONDS (ref 12.4–14.7)
RBC # BLD AUTO: 3.92 X10(6)UL (ref 3.8–5.8)
RED CELL DISTRIBUTION WIDTH-SD: 47 FL (ref 35.1–46.3)
SODIUM SERPL-SCNC: 143 MMOL/L (ref 136–144)
WBC # BLD AUTO: 10.1 X10(3) UL (ref 4–13)

## 2019-01-05 PROCEDURE — 94640 AIRWAY INHALATION TREATMENT: CPT

## 2019-01-05 PROCEDURE — 80048 BASIC METABOLIC PNL TOTAL CA: CPT | Performed by: SPECIALIST

## 2019-01-05 PROCEDURE — 71045 X-RAY EXAM CHEST 1 VIEW: CPT | Performed by: INTERNAL MEDICINE

## 2019-01-05 PROCEDURE — 85025 COMPLETE CBC W/AUTO DIFF WBC: CPT | Performed by: SPECIALIST

## 2019-01-05 PROCEDURE — 85610 PROTHROMBIN TIME: CPT | Performed by: SPECIALIST

## 2019-01-05 PROCEDURE — 92526 ORAL FUNCTION THERAPY: CPT

## 2019-01-05 PROCEDURE — 83735 ASSAY OF MAGNESIUM: CPT | Performed by: SPECIALIST

## 2019-01-05 PROCEDURE — 36415 COLL VENOUS BLD VENIPUNCTURE: CPT

## 2019-01-05 PROCEDURE — 84132 ASSAY OF SERUM POTASSIUM: CPT | Performed by: SPECIALIST

## 2019-01-05 RX ORDER — WARFARIN SODIUM 1 MG/1
1 TABLET ORAL
Status: COMPLETED | OUTPATIENT
Start: 2019-01-05 | End: 2019-01-05

## 2019-01-05 RX ORDER — POTASSIUM CHLORIDE 20 MEQ/1
40 TABLET, EXTENDED RELEASE ORAL EVERY 4 HOURS
Status: COMPLETED | OUTPATIENT
Start: 2019-01-05 | End: 2019-01-05

## 2019-01-05 RX ORDER — MAGNESIUM OXIDE 400 MG (241.3 MG MAGNESIUM) TABLET
400 TABLET ONCE
Status: COMPLETED | OUTPATIENT
Start: 2019-01-05 | End: 2019-01-05

## 2019-01-05 RX ORDER — FUROSEMIDE 10 MG/ML
60 INJECTION INTRAMUSCULAR; INTRAVENOUS
Status: DISCONTINUED | OUTPATIENT
Start: 2019-01-05 | End: 2019-01-07

## 2019-01-05 RX ORDER — POTASSIUM CHLORIDE 20 MEQ/1
40 TABLET, EXTENDED RELEASE ORAL ONCE
Status: COMPLETED | OUTPATIENT
Start: 2019-01-05 | End: 2019-01-05

## 2019-01-05 NOTE — CONSULTS
[facility] Dosing Service  Warfarin (Coumadin) Subsequent Dosing    Yo Mcmahan is a 80year old male for whom pharmacy has been dosing warfarin (Coumadin).  Goal INR is 2-3    Recent Labs   Lab  01/01/19   0504  01/02/19   0550  01/03/19   0554  01

## 2019-01-05 NOTE — PLAN OF CARE
Problem: PAIN - ADULT  Goal: Verbalizes/displays adequate comfort level or patient's stated pain goal  INTERVENTIONS:  - Encourage pt to monitor pain and request assistance  - Assess pain using appropriate pain scale  - Administer analgesics based on type upright for all feedings (90 degrees preferred)  - Offer food and liquids at a slow rate    - Encourage small bites of food and small sips of liquid  - Offer pills one at a time, crush or deliver with applesauce as needed  - Discontinue feeding and notify

## 2019-01-05 NOTE — SLP NOTE
SPEECH DAILY NOTE - INPATIENT    ASSESSMENT & PLAN   ASSESSMENT  Pt seen this morning for meal monitor to ensure toleration/safety of current diet and understanding/implementation of recommended aspiration precautions/compensatory strategies.  Pt sitting up patient with mod assistance 95% of the time across 2 sessions.  In progress     FOLLOW UP  Follow Up Needed: Yes  SLP Follow-up Date: 01/06/19  Number of Visits to Meet Established Goals: 3    Session: 2    If you have any questions, please contact Sujey Chambers

## 2019-01-05 NOTE — PLAN OF CARE
Patient appears congested, abdominal breath, denies SOB when asked   in progress, oxygen saturation 96-97%, Dr. Veena Navarrete here to see patient new order noted.  Lasix 60 mg IV given, X-ray called for STAT portable X-ray, IV fluids decreased to 42 cc per

## 2019-01-05 NOTE — PROGRESS NOTES
[facility]  Progress Note    Brendan Dupree Patient Status:  Inpatient    1935 MRN [id number]   [facility] 2NE-A Attending Gerardo Felix MD   Hosp Day # 4 PCP Rachelle Pacheco MD     Subjective:  Lakeshia Bowman is a(n) 80 y PLT  165.0  153.0  171.0     Recent Labs   Lab  01/02/19   0550   01/03/19   0554  01/03/19   1449  01/04/19   0606  01/05/19   0624   GLU  106*   --   114*   --    --   121*   BUN  31*   --   28*   --    --   18   CREATSERUM  1.81*   --   1.50*   --

## 2019-01-05 NOTE — PLAN OF CARE
Assumed care at   Patient alert to self  On 2L O2  Expressive aphasia and slurred speech noted per baseline  V paced on tele  Denies pain or discomfort  Incontinent and briefed, changed frequently  Up x1 assist and walker, tolerating well  Impulsive at

## 2019-01-05 NOTE — PROGRESS NOTES
[facility]  Progress Note    Tanisha Dupree Patient Status:  Inpatient    1935 MRN [id number]   [facility] 2NE-A Attending Dylon Boyd MD   Hosp Day # 4 PCP Pennie Olea MD         SUBJECTIVE:  Subjective:  Tanisha Johnson 143   K  4.4  3.3*  3.4*  3.3*  3.3*  3.8  3.7  3.0*   CL  110  113*   --   111   --    --   106   CO2  27.0  28.0   --   33.0*   --    --   32.0   BUN  26*  31*   --   28*   --    --   18   CREATSERUM  2.13*  1.81*   --   1.50*   --    --   1.27   CA  7.9 and echo bnp nl dec demadex improving slowly supplement electrolytes   See tests ordered,  Available and radiology reviewed  Discussed with nursing on floor  dvt prophylaxis reviewed  PT and/or OT  Ronna Hills MD

## 2019-01-06 LAB
BUN BLD-MCNC: 15 MG/DL (ref 8–20)
CALCIUM BLD-MCNC: 7.7 MG/DL (ref 8.3–10.3)
CHLORIDE SERPL-SCNC: 107 MMOL/L (ref 101–111)
CO2 SERPL-SCNC: 29 MMOL/L (ref 22–32)
CREAT BLD-MCNC: 1.27 MG/DL (ref 0.7–1.3)
ERYTHROCYTE [DISTWIDTH] IN BLOOD BY AUTOMATED COUNT: 13.7 % (ref 11.5–16)
GLUCOSE BLD-MCNC: 102 MG/DL (ref 70–99)
HAV IGM SER QL: 2 MG/DL (ref 1.8–2.5)
HCT VFR BLD AUTO: 36.8 % (ref 37–53)
HGB BLD-MCNC: 12 G/DL (ref 13–17)
INR BLD: 2.04 (ref 0.9–1.1)
MCH RBC QN AUTO: 30.7 PG (ref 27–33.2)
MCHC RBC AUTO-ENTMCNC: 32.6 G/DL (ref 31–37)
MCV RBC AUTO: 94.1 FL (ref 80–99)
PLATELET # BLD AUTO: 173 10(3)UL (ref 150–450)
POTASSIUM SERPL-SCNC: 3.9 MMOL/L (ref 3.6–5.1)
POTASSIUM SERPL-SCNC: 3.9 MMOL/L (ref 3.6–5.1)
PSA SERPL DL<=0.01 NG/ML-MCNC: 23.7 SECONDS (ref 12.4–14.7)
RBC # BLD AUTO: 3.91 X10(6)UL (ref 3.8–5.8)
RED CELL DISTRIBUTION WIDTH-SD: 46.8 FL (ref 35.1–46.3)
SODIUM SERPL-SCNC: 142 MMOL/L (ref 136–144)
WBC # BLD AUTO: 9.8 X10(3) UL (ref 4–13)

## 2019-01-06 PROCEDURE — 83735 ASSAY OF MAGNESIUM: CPT | Performed by: INTERNAL MEDICINE

## 2019-01-06 PROCEDURE — 85610 PROTHROMBIN TIME: CPT | Performed by: SPECIALIST

## 2019-01-06 PROCEDURE — 84132 ASSAY OF SERUM POTASSIUM: CPT | Performed by: SPECIALIST

## 2019-01-06 PROCEDURE — 85027 COMPLETE CBC AUTOMATED: CPT | Performed by: INTERNAL MEDICINE

## 2019-01-06 PROCEDURE — 94640 AIRWAY INHALATION TREATMENT: CPT

## 2019-01-06 PROCEDURE — 80048 BASIC METABOLIC PNL TOTAL CA: CPT | Performed by: INTERNAL MEDICINE

## 2019-01-06 PROCEDURE — 87493 C DIFF AMPLIFIED PROBE: CPT | Performed by: SPECIALIST

## 2019-01-06 RX ORDER — GUAIFENESIN 600 MG
1200 TABLET, EXTENDED RELEASE 12 HR ORAL 2 TIMES DAILY
Status: DISCONTINUED | OUTPATIENT
Start: 2019-01-06 | End: 2019-01-10

## 2019-01-06 RX ORDER — FAMOTIDINE 20 MG/1
20 TABLET ORAL DAILY
Status: DISCONTINUED | OUTPATIENT
Start: 2019-01-06 | End: 2019-01-10

## 2019-01-06 RX ORDER — METHYLPREDNISOLONE SODIUM SUCCINATE 125 MG/2ML
60 INJECTION, POWDER, LYOPHILIZED, FOR SOLUTION INTRAMUSCULAR; INTRAVENOUS EVERY 8 HOURS
Status: DISCONTINUED | OUTPATIENT
Start: 2019-01-06 | End: 2019-01-07

## 2019-01-06 RX ORDER — WARFARIN SODIUM 2 MG/1
2 TABLET ORAL
Status: COMPLETED | OUTPATIENT
Start: 2019-01-06 | End: 2019-01-06

## 2019-01-06 NOTE — PROGRESS NOTES
Pt. Alert o x 2 , on and off O2 as needed. On mod. High back rest , not in any form of resp. Distress , but noticed tonight that pt. 'c coughing a lot , even w/o drinking. Tele shows V paced on the monitor . Cont. Nebx tx , IV Lasix , IVF as ordered.

## 2019-01-06 NOTE — PLAN OF CARE
Problem: PAIN - ADULT  Goal: Verbalizes/displays adequate comfort level or patient's stated pain goal  INTERVENTIONS:  - Encourage pt to monitor pain and request assistance  - Assess pain using appropriate pain scale  - Administer analgesics based on type appropriate resources  INTERVENTIONS:  - Identify barriers to discharge w/pt and caregiver  - Include patient/family/discharge partner in discharge planning  - Arrange for needed discharge resources and transportation as appropriate  - Identify discharge l wet/gurgly vocal quality is noted   Outcome: Progressing  Aspirations precautions in progress  Modified consistency diet in progress, pureed, nectar thick liquids  Intermittent coughing noted though not eating or drinking at the time , nonproductive  Speec instruct to report SOB or any respiratory difficulty  - Respiratory Therapy support as indicated  - Manage/alleviate anxiety  - Monitor for signs/symptoms of CO2 retention  Outcome: Progressing   in progress .  Oxygen in use at 2 liters, oxygen saturatio

## 2019-01-06 NOTE — PLAN OF CARE
Altered Communication/Language Barrier    • Patient/Family is able to understand and participate in their care Progressing        Impaired Swallowing    • Minimize aspiration risk Progressing        RESPIRATORY - ADULT    • Achieves optimal ventilation and

## 2019-01-06 NOTE — PROGRESS NOTES
[facility]  Progress Note    Rose Dupree Patient Status:  Inpatient    1935 MRN [id number]   [facility] 2NE-A Attending Sherman Daly MD   Hosp Day # 5 PCP Blank Mann MD     Subjective:  Didier Flow is a(n) 80 y 94.1   MCH  30.3  29.6  30.1  30.7   MCHC  32.2  31.2  31.9  32.6   RDW  14.3  14.6  13.5  13.7   NEPRELIM  13.05*  7.87*  7.00*   --    WBC  15.2*  10.7  10.1  9.8   PLT  165.0  153.0  171.0  173.0     Recent Labs   Lab  01/03/19   0554   01/05/19   7295

## 2019-01-06 NOTE — CONSULTS
[facility] Dosing Service  Warfarin (Coumadin) Subsequent Dosing    Isabel Brooks is a 80year old male for whom pharmacy has been dosing warfarin (Coumadin).  Goal INR is 2-3    Recent Labs   Lab  01/02/19   0550  01/03/19   0554  01/04/19   0841  01

## 2019-01-06 NOTE — PROGRESS NOTES
[facility]  Progress Note    Connee Sever Allrich Patient Status:  Inpatient    1935 MRN [id number]   [facility] 2NE-A Attending Russel Miranda MD   Hosp Day # 5 PCP Kiran Ulloa MD         SUBJECTIVE:  Subjective:  Connee Sever All 01/03/19   1449  01/04/19   0606  01/05/19   0624  01/05/19   1926  01/06/19   0729   NA  147*  148*   --   147*   --    --   143   --   142   K  4.4  3.3*   < >  3.3*  3.3*  3.8  3.7  3.0*  3.7  3.9  3.9   CL  110  113*   --   111   --    --   106   -- Principal Problem:    Nosocomial pneumonia  Active Problems:    Dementia    Elevated troponin    Encephalopathy acute    CHF (congestive heart failure) (HCC)    Vomiting and diarrhea    Hypokalemia and hypomag  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

## 2019-01-07 ENCOUNTER — APPOINTMENT (OUTPATIENT)
Dept: GENERAL RADIOLOGY | Facility: HOSPITAL | Age: 84
DRG: 177 | End: 2019-01-07
Attending: INTERNAL MEDICINE
Payer: MEDICARE

## 2019-01-07 LAB
BUN BLD-MCNC: 20 MG/DL (ref 8–20)
CALCIUM BLD-MCNC: 8.3 MG/DL (ref 8.3–10.3)
CHLORIDE SERPL-SCNC: 101 MMOL/L (ref 101–111)
CO2 SERPL-SCNC: 28 MMOL/L (ref 22–32)
CREAT BLD-MCNC: 1.44 MG/DL (ref 0.7–1.3)
GLUCOSE BLD-MCNC: 164 MG/DL (ref 70–99)
HAV IGM SER QL: 2 MG/DL (ref 1.8–2.5)
INR BLD: 1.6 (ref 0.9–1.1)
POTASSIUM SERPL-SCNC: 3.7 MMOL/L (ref 3.6–5.1)
PRO-BETA NATRIURETIC PEPTIDE: 1832 PG/ML (ref ?–450)
PSA SERPL DL<=0.01 NG/ML-MCNC: 19.6 SECONDS (ref 12.4–14.7)
SODIUM SERPL-SCNC: 139 MMOL/L (ref 136–144)

## 2019-01-07 PROCEDURE — 83880 ASSAY OF NATRIURETIC PEPTIDE: CPT | Performed by: INTERNAL MEDICINE

## 2019-01-07 PROCEDURE — 92526 ORAL FUNCTION THERAPY: CPT

## 2019-01-07 PROCEDURE — 94640 AIRWAY INHALATION TREATMENT: CPT

## 2019-01-07 PROCEDURE — 71045 X-RAY EXAM CHEST 1 VIEW: CPT | Performed by: INTERNAL MEDICINE

## 2019-01-07 PROCEDURE — 83735 ASSAY OF MAGNESIUM: CPT | Performed by: INTERNAL MEDICINE

## 2019-01-07 PROCEDURE — 80048 BASIC METABOLIC PNL TOTAL CA: CPT | Performed by: INTERNAL MEDICINE

## 2019-01-07 PROCEDURE — 85610 PROTHROMBIN TIME: CPT | Performed by: SPECIALIST

## 2019-01-07 RX ORDER — WARFARIN SODIUM 2.5 MG/1
2.5 TABLET ORAL
Status: COMPLETED | OUTPATIENT
Start: 2019-01-07 | End: 2019-01-07

## 2019-01-07 RX ORDER — POTASSIUM CHLORIDE 20 MEQ/1
40 TABLET, EXTENDED RELEASE ORAL ONCE
Status: COMPLETED | OUTPATIENT
Start: 2019-01-07 | End: 2019-01-07

## 2019-01-07 RX ORDER — GARLIC EXTRACT 500 MG
1 CAPSULE ORAL 2 TIMES DAILY WITH MEALS
Status: DISCONTINUED | OUTPATIENT
Start: 2019-01-07 | End: 2019-01-10

## 2019-01-07 RX ORDER — METHYLPREDNISOLONE SODIUM SUCCINATE 125 MG/2ML
60 INJECTION, POWDER, LYOPHILIZED, FOR SOLUTION INTRAMUSCULAR; INTRAVENOUS EVERY 12 HOURS
Status: COMPLETED | OUTPATIENT
Start: 2019-01-08 | End: 2019-01-08

## 2019-01-07 RX ORDER — FUROSEMIDE 10 MG/ML
40 INJECTION INTRAMUSCULAR; INTRAVENOUS
Status: DISCONTINUED | OUTPATIENT
Start: 2019-01-07 | End: 2019-01-09

## 2019-01-07 NOTE — PROGRESS NOTES
[facility]  Progress Note    Gerardo Dupree Patient Status:  Inpatient    1935 MRN [id number]   [facility] 2NE-A Attending Jason Hartman MD   Hosp Day # 6 PCP Chris Rangel MD         SUBJECTIVE:  Subjective:  Gerardo Johnson 7857   01/03/19   0554   01/04/19   0606  01/05/19   4071  01/05/19   1926  01/06/19   0729  01/07/19   0729   NA  148*   --   147*   --    --   143   --   142  139   K  3.3*   < >  3.3*  3.3*   < >  3.7  3.0*  3.7  3.9  3.9  3.7   CL  113*   --   111   -- HCl, Metoclopramide HCl, ipratropium-albuterol, peppermint oil       Assessment/Plan:     Principal Problem:    Nosocomial pneumonia  Active Problems:    Dementia    Elevated troponin    Encephalopathy acute    CHF (congestive heart failure) (HCC)    Vomit

## 2019-01-07 NOTE — SLP NOTE
SPEECH DAILY NOTE - INPATIENT    ASSESSMENT & PLAN   ASSESSMENT  Pt seen for dysphagia tx to assess tolerance with recommended diet, ensure appropriate utilization of aspiration precautions and provide pt/family education.  Pt found sitting up on side of be Alternate liquids/solids, Upright 90 degrees 30 mins after meal, Feed patient with mod assistance 95% of the time across 2 sessions.  In progress               FOLLOW UP  Follow Up Needed: Yes  SLP Follow-up Date: 01/08/19  Number of Visits to Meet [facility]

## 2019-01-07 NOTE — PROGRESS NOTES
[facility]  Progress Note    Graciela Dupree Patient Status:  Inpatient    1935 MRN [id number]   [facility] 2NE-A Attending Piyush Roque MD   Hosp Day # 6 PCP Nichole Danielle MD     Subjective:  Paul Gonzales is a(n) 80 y 13.7   NEPRELIM  13.05*  7.87*  7.00*   --    WBC  15.2*  10.7  10.1  9.8   PLT  165.0  153.0  171.0  173.0     Recent Labs   Lab  01/05/19   0624  01/05/19   1926  01/06/19   0729  01/07/19   0729   GLU  121*   --   102*  164*   BUN  18   --   15  20   CR

## 2019-01-07 NOTE — PROGRESS NOTES
[facility] Dosing Service  Warfarin (Coumadin) Subsequent Dosing    Marcin Johnson is a 80year old male for whom pharmacy has been dosing warfarin (Coumadin).  Goal INR is 2-3    Recent Labs   Lab  01/03/19   0554  01/04/19   0841  01/05/19   0624  01

## 2019-01-07 NOTE — PHYSICAL THERAPY NOTE
Attempted to see Pt this AM - KORTNEY Rand aware of attempt. Pt just finished breakfast - encouraged to ambulate with writer. Pt politely refusing at this time.   Will f/u later today if time permits, after all other patients are attempted per tentative schedu

## 2019-01-07 NOTE — PLAN OF CARE
Problem: PAIN - ADULT  Goal: Verbalizes/displays adequate comfort level or patient's stated pain goal  INTERVENTIONS:  - Encourage pt to monitor pain and request assistance  - Assess pain using appropriate pain scale  - Administer analgesics based on type (meds, wound care, etc)  - Arrange for interpreters to assist at discharge as needed  - Consider post-discharge preferences of patient/family/discharge partner  - Complete POLST form as appropriate  - Assess patient's ability to be responsible for managing is diminished, incont of urine, buttocks is excorited up with walker and 1 assist, no edema noted. Poc updated, iv diuretics, IV abx, iv steroids, nebs tx, skin care, asp precaution, fall precaution.  Cpm.

## 2019-01-08 LAB
ANION GAP SERPL CALC-SCNC: 3 MMOL/L (ref 0–18)
BASOPHILS # BLD AUTO: 0.02 X10(3) UL (ref 0–0.1)
BASOPHILS NFR BLD AUTO: 0.1 %
BUN BLD-MCNC: 28 MG/DL (ref 8–20)
BUN/CREAT SERPL: 18.5 (ref 10–20)
CALCIUM BLD-MCNC: 8.4 MG/DL (ref 8.3–10.3)
CHLORIDE SERPL-SCNC: 103 MMOL/L (ref 101–111)
CO2 SERPL-SCNC: 29 MMOL/L (ref 22–32)
CREAT BLD-MCNC: 1.51 MG/DL (ref 0.7–1.3)
EOSINOPHIL # BLD AUTO: 0 X10(3) UL (ref 0–0.3)
EOSINOPHIL NFR BLD AUTO: 0 %
ERYTHROCYTE [DISTWIDTH] IN BLOOD BY AUTOMATED COUNT: 13.5 % (ref 11.5–16)
GLUCOSE BLD-MCNC: 138 MG/DL (ref 70–99)
HCT VFR BLD AUTO: 38.8 % (ref 37–53)
HGB BLD-MCNC: 12.4 G/DL (ref 13–17)
IMMATURE GRANULOCYTE COUNT: 0.26 X10(3) UL (ref 0–1)
IMMATURE GRANULOCYTE RATIO %: 1.8 %
INR BLD: 1.75 (ref 0.9–1.1)
LYMPHOCYTES # BLD AUTO: 1.03 X10(3) UL (ref 0.9–4)
LYMPHOCYTES NFR BLD AUTO: 7.2 %
MCH RBC QN AUTO: 29.7 PG (ref 27–33.2)
MCHC RBC AUTO-ENTMCNC: 32 G/DL (ref 31–37)
MCV RBC AUTO: 93 FL (ref 80–99)
MONOCYTES # BLD AUTO: 0.54 X10(3) UL (ref 0.1–1)
MONOCYTES NFR BLD AUTO: 3.8 %
NEUTROPHIL ABS PRELIM: 12.42 X10 (3) UL (ref 1.3–6.7)
NEUTROPHILS # BLD AUTO: 12.42 X10(3) UL (ref 1.3–6.7)
NEUTROPHILS NFR BLD AUTO: 87.1 %
OSMOLALITY SERPL CALC.SUM OF ELEC: 288 MOSM/KG (ref 275–295)
PLATELET # BLD AUTO: 257 10(3)UL (ref 150–450)
POTASSIUM SERPL-SCNC: 4.3 MMOL/L (ref 3.6–5.1)
POTASSIUM SERPL-SCNC: 4.3 MMOL/L (ref 3.6–5.1)
PSA SERPL DL<=0.01 NG/ML-MCNC: 21.1 SECONDS (ref 12.4–14.7)
RBC # BLD AUTO: 4.17 X10(6)UL (ref 3.8–5.8)
RED CELL DISTRIBUTION WIDTH-SD: 45.3 FL (ref 35.1–46.3)
SODIUM SERPL-SCNC: 135 MMOL/L (ref 136–144)
WBC # BLD AUTO: 14.3 X10(3) UL (ref 4–13)

## 2019-01-08 PROCEDURE — 97116 GAIT TRAINING THERAPY: CPT

## 2019-01-08 PROCEDURE — 97110 THERAPEUTIC EXERCISES: CPT

## 2019-01-08 PROCEDURE — 85610 PROTHROMBIN TIME: CPT | Performed by: SPECIALIST

## 2019-01-08 PROCEDURE — 94640 AIRWAY INHALATION TREATMENT: CPT

## 2019-01-08 PROCEDURE — 85025 COMPLETE CBC W/AUTO DIFF WBC: CPT | Performed by: SPECIALIST

## 2019-01-08 PROCEDURE — 84132 ASSAY OF SERUM POTASSIUM: CPT | Performed by: SPECIALIST

## 2019-01-08 PROCEDURE — 80048 BASIC METABOLIC PNL TOTAL CA: CPT | Performed by: SPECIALIST

## 2019-01-08 RX ORDER — WARFARIN SODIUM 2.5 MG/1
2.5 TABLET ORAL
Status: COMPLETED | OUTPATIENT
Start: 2019-01-08 | End: 2019-01-08

## 2019-01-08 NOTE — PROGRESS NOTES
[facility] Dosing Service  Warfarin (Coumadin) Subsequent Dosing    Esau Abebe is a 80year old male for whom pharmacy has been dosing warfarin (Coumadin). Goal INR is 2-3.     Recent Labs   Lab  01/04/19   0841  01/05/19   0624  01/06/19   0729  0

## 2019-01-08 NOTE — PHYSICAL THERAPY NOTE
PHYSICAL THERAPY TREATMENT NOTE - INPATIENT    Room Number: 2627/2627-A     Session: 1   Number of Visits to Meet Established Goals: 5    Presenting Problem: fever, possible pneumonia    Problem List  Principal Problem:    Nosocomial pneumonia  Active Pro MOBILITY  How much difficulty does the patient currently have. ..  -   Turning over in bed (including adjusting bedclothes, sheets and blankets)?: A Little   -   Sitting down on and standing up from a chair with arms (e.g., wheelchair, bedside commode, etc. endurance as he is able to increase ambulation distance and required decreased assistance for transfers and ambulation. The pt will continue to benefit from physical therapy in order to meet therapy goals.     DISCHARGE RECOMMENDATIONS  PT Discharge Recomm

## 2019-01-08 NOTE — PROGRESS NOTES
[facility]  Progress Note    Joelle Carmela Dupree Patient Status:  Inpatient    1935 MRN [id number]   [facility] 2NE-A Attending Mary Carter MD   Hosp Day # 7 PCP Topher Horvath MD     Subjective:  Marcin Johnson is a(n) 80 y 10.7  10.1  9.8  14.3*   PLT  153.0  171.0  173.0  257.0     Recent Labs   Lab  01/06/19   0729  01/07/19   0729  01/08/19   0557   GLU  102*  164*  138*   BUN  15  20  28*   CREATSERUM  1.27  1.44*  1.51*   GFRAA  60  52*  49*   GFRNAA  52*  45*  42*   CA

## 2019-01-08 NOTE — PROGRESS NOTES
[facility]  Progress Note    Vishal Dupree Patient Status:  Inpatient    1935 MRN [id number]   [facility] 2NE-A Attending Lucretia Salgado MD   Hosp Day # 7 PCP Marisela Rios MD         SUBJECTIVE:  Subjective:  Vishal Johnson 01/06/19 0729 01/07/19 0729 01/08/19   0557   NA  148*   --   147*   --   143   --   142  139  135*   K  3.3*   < >  3.3*  3.3*   < >  3.0*  3.7  3.9  3.9  3.7  4.3  4.3   CL  113*   --   111   --   106   --   107  101  103   CO2  28.0   --   33.0* HCl, ipratropium-albuterol, peppermint oil       Assessment/Plan:     Principal Problem:    Nosocomial pneumonia  Active Problems:    Dementia    Elevated troponin    Encephalopathy acute    CHF (congestive heart failure) (HCC)    Vomiting and diarrhea

## 2019-01-09 ENCOUNTER — APPOINTMENT (OUTPATIENT)
Dept: GENERAL RADIOLOGY | Facility: HOSPITAL | Age: 84
DRG: 177 | End: 2019-01-09
Attending: INTERNAL MEDICINE
Payer: MEDICARE

## 2019-01-09 LAB
ALBUMIN SERPL-MCNC: 2.8 G/DL (ref 3.1–4.5)
ALBUMIN/GLOB SERPL: 0.7 {RATIO} (ref 1–2)
ALP LIVER SERPL-CCNC: 52 U/L (ref 45–117)
ALT SERPL-CCNC: 55 U/L (ref 17–63)
ANION GAP SERPL CALC-SCNC: 9 MMOL/L (ref 0–18)
AST SERPL-CCNC: 36 U/L (ref 15–41)
BASOPHILS # BLD AUTO: 0.02 X10(3) UL (ref 0–0.1)
BASOPHILS NFR BLD AUTO: 0.2 %
BILIRUB SERPL-MCNC: 0.4 MG/DL (ref 0.1–2)
BUN BLD-MCNC: 39 MG/DL (ref 8–20)
BUN/CREAT SERPL: 24.4 (ref 10–20)
CALCIUM BLD-MCNC: 8.2 MG/DL (ref 8.3–10.3)
CHLORIDE SERPL-SCNC: 104 MMOL/L (ref 101–111)
CO2 SERPL-SCNC: 29 MMOL/L (ref 22–32)
CREAT BLD-MCNC: 1.6 MG/DL (ref 0.7–1.3)
EOSINOPHIL # BLD AUTO: 0 X10(3) UL (ref 0–0.3)
EOSINOPHIL NFR BLD AUTO: 0 %
ERYTHROCYTE [DISTWIDTH] IN BLOOD BY AUTOMATED COUNT: 13.9 % (ref 11.5–16)
GLOBULIN PLAS-MCNC: 4 G/DL (ref 2.8–4.4)
GLUCOSE BLD-MCNC: 143 MG/DL (ref 70–99)
HAV IGM SER QL: 2.4 MG/DL (ref 1.8–2.5)
HCT VFR BLD AUTO: 37.8 % (ref 37–53)
HGB BLD-MCNC: 12.6 G/DL (ref 13–17)
IMMATURE GRANULOCYTE COUNT: 0.25 X10(3) UL (ref 0–1)
IMMATURE GRANULOCYTE RATIO %: 2 %
INR BLD: 2 (ref 0.9–1.1)
LYMPHOCYTES # BLD AUTO: 1.44 X10(3) UL (ref 0.9–4)
LYMPHOCYTES NFR BLD AUTO: 11.3 %
M PROTEIN MFR SERPL ELPH: 6.8 G/DL (ref 6.4–8.2)
MCH RBC QN AUTO: 30.8 PG (ref 27–33.2)
MCHC RBC AUTO-ENTMCNC: 33.3 G/DL (ref 31–37)
MCV RBC AUTO: 92.4 FL (ref 80–99)
MONOCYTES # BLD AUTO: 1.11 X10(3) UL (ref 0.1–1)
MONOCYTES NFR BLD AUTO: 8.7 %
NEUTROPHIL ABS PRELIM: 9.89 X10 (3) UL (ref 1.3–6.7)
NEUTROPHILS # BLD AUTO: 9.89 X10(3) UL (ref 1.3–6.7)
NEUTROPHILS NFR BLD AUTO: 77.8 %
OSMOLALITY SERPL CALC.SUM OF ELEC: 306 MOSM/KG (ref 275–295)
PLATELET # BLD AUTO: 267 10(3)UL (ref 150–450)
POTASSIUM SERPL-SCNC: 3.4 MMOL/L (ref 3.6–5.1)
POTASSIUM SERPL-SCNC: 4.2 MMOL/L (ref 3.6–5.1)
PSA SERPL DL<=0.01 NG/ML-MCNC: 23.4 SECONDS (ref 12.4–14.7)
RBC # BLD AUTO: 4.09 X10(6)UL (ref 3.8–5.8)
RED CELL DISTRIBUTION WIDTH-SD: 46.8 FL (ref 35.1–46.3)
SODIUM SERPL-SCNC: 142 MMOL/L (ref 136–144)
WBC # BLD AUTO: 12.7 X10(3) UL (ref 4–13)

## 2019-01-09 PROCEDURE — 94640 AIRWAY INHALATION TREATMENT: CPT

## 2019-01-09 PROCEDURE — 71045 X-RAY EXAM CHEST 1 VIEW: CPT | Performed by: INTERNAL MEDICINE

## 2019-01-09 PROCEDURE — 85610 PROTHROMBIN TIME: CPT | Performed by: SPECIALIST

## 2019-01-09 PROCEDURE — 85025 COMPLETE CBC W/AUTO DIFF WBC: CPT | Performed by: INTERNAL MEDICINE

## 2019-01-09 PROCEDURE — 92526 ORAL FUNCTION THERAPY: CPT

## 2019-01-09 PROCEDURE — 80053 COMPREHEN METABOLIC PANEL: CPT | Performed by: INTERNAL MEDICINE

## 2019-01-09 PROCEDURE — 83735 ASSAY OF MAGNESIUM: CPT | Performed by: INTERNAL MEDICINE

## 2019-01-09 PROCEDURE — 84132 ASSAY OF SERUM POTASSIUM: CPT | Performed by: SPECIALIST

## 2019-01-09 RX ORDER — WARFARIN SODIUM 2.5 MG/1
2.5 TABLET ORAL
Status: COMPLETED | OUTPATIENT
Start: 2019-01-09 | End: 2019-01-09

## 2019-01-09 RX ORDER — FUROSEMIDE 40 MG/1
40 TABLET ORAL DAILY
Status: DISCONTINUED | OUTPATIENT
Start: 2019-01-09 | End: 2019-01-10

## 2019-01-09 RX ORDER — POTASSIUM CHLORIDE 20 MEQ/1
40 TABLET, EXTENDED RELEASE ORAL EVERY 4 HOURS
Status: COMPLETED | OUTPATIENT
Start: 2019-01-09 | End: 2019-01-09

## 2019-01-09 NOTE — PROGRESS NOTES
[facility]  Progress Note    Sundar Dupree Patient Status:  Inpatient    1935 MRN [id number]   [facility] 2NE-A Attending Lissette White MD   [facility] Day # 8 PCP Jennifer Mccarthy MD     Subjective:  Shyann Jane is a(n) 80 ye 32.6  32.0  33.3   RDW  13.5  13.7  13.5  13.9   NEPRELIM  7.00*   --   12.42*  9.89*   WBC  10.1  9.8  14.3*  12.7   PLT  171.0  173.0  257.0  267.0     Recent Labs   Lab  01/07/19   0729  01/08/19   0557  01/09/19   0602   GLU  164*  138*  143*   BUN  20

## 2019-01-09 NOTE — CONSULTS
Pharmacy Dosing Service: Warfarin (Coumadin)    Davie Olszewski is a 80year old male for whom pharmacy has been dosing warfarin (Coumadin).  Goal INR is 2-3    Recent Labs   Lab  01/05/19   0624  01/06/19   0729  01/07/19   0729  01/08/19   0557  01/09/1

## 2019-01-09 NOTE — PLAN OF CARE
Received pt a/o to self, 2L NC, V paced underlying afib on tele at 0700  Lung sounds diminished, non productive cough  Pt up in chair, redness to buttocks, sacrum with barrier cream applied, pt incontinent of bowel and bladder, briefed  Pt non verbal for m

## 2019-01-09 NOTE — PROGRESS NOTES
[facility]  Progress Note    Zach Dupree Patient Status:  Inpatient    1935 MRN [id number]   [facility] 2NE-A Attending Shana Morgan MD   Hosp Day # 8 PCP Guillermo Torres MD         SUBJECTIVE:  Subjective:  Zach Johnson CL  106   --   107  101  103  104   CO2  32.0   --   29.0  28.0  29.0  29.0   BUN  18   --   15  20  28*  39*   CREATSERUM  1.27   --   1.27  1.44*  1.51*  1.60*   CA  7.7*   --   7.7*  8.3  8.4  8.2*   MG  1.7*   --   2.0  2.0   --   2.4   GLU  121*   - lasix    Hx cva/ dementia- assist with adls    Azotemia- monitor hydration    dvt px- warfarin per rx    See tests ordered,  Available and radiology reviewed  Discussed with nursing on floor  dvt prophylaxis reviewed  PT and/or OT  ana michaels md

## 2019-01-09 NOTE — SLP NOTE
SPEECH DAILY NOTE - INPATIENT    ASSESSMENT & PLAN   ASSESSMENT  Pt seen for dysphagia tx to assess tolerance with recommended diet, ensure proper utilization of aspiration precautions and provide pt/family education.   Pt found lying in bed with eyes close of Visits to Meet Established Goals: 1    Session: 1/1    If you have any questions, please contact Aminah Daly

## 2019-01-09 NOTE — CM/SW NOTE
Interdisciplinary Rounds: 01/09/19  Admitted: 1/1/2019 LOS: 8  Disciplines in attendance: charge nurse, staff nurse, CM, [address] and discharge plan reviewed.     Outcome/issues identified:   RA during the day yesterday, 2L NC while sleeping desaturat

## 2019-01-10 VITALS
RESPIRATION RATE: 18 BRPM | HEIGHT: 74 IN | HEART RATE: 60 BPM | SYSTOLIC BLOOD PRESSURE: 148 MMHG | DIASTOLIC BLOOD PRESSURE: 67 MMHG | TEMPERATURE: 98 F | BODY MASS INDEX: 27.19 KG/M2 | OXYGEN SATURATION: 94 % | WEIGHT: 211.88 LBS

## 2019-01-10 LAB
BASOPHILS # BLD AUTO: 0.03 X10(3) UL (ref 0–0.1)
BASOPHILS NFR BLD AUTO: 0.3 %
EOSINOPHIL # BLD AUTO: 0 X10(3) UL (ref 0–0.3)
EOSINOPHIL NFR BLD AUTO: 0 %
ERYTHROCYTE [DISTWIDTH] IN BLOOD BY AUTOMATED COUNT: 13.8 % (ref 11.5–16)
HCT VFR BLD AUTO: 37.9 % (ref 37–53)
HGB BLD-MCNC: 12.6 G/DL (ref 13–17)
IMMATURE GRANULOCYTE COUNT: 0.31 X10(3) UL (ref 0–1)
IMMATURE GRANULOCYTE RATIO %: 2.6 %
INR BLD: 2.39 (ref 0.9–1.1)
LYMPHOCYTES # BLD AUTO: 1.73 X10(3) UL (ref 0.9–4)
LYMPHOCYTES NFR BLD AUTO: 14.8 %
MCH RBC QN AUTO: 30.3 PG (ref 27–33.2)
MCHC RBC AUTO-ENTMCNC: 33.2 G/DL (ref 31–37)
MCV RBC AUTO: 91.1 FL (ref 80–99)
MONOCYTES # BLD AUTO: 1.46 X10(3) UL (ref 0.1–1)
MONOCYTES NFR BLD AUTO: 12.5 %
NEUTROPHIL ABS PRELIM: 8.18 X10 (3) UL (ref 1.3–6.7)
NEUTROPHILS # BLD AUTO: 8.18 X10(3) UL (ref 1.3–6.7)
NEUTROPHILS NFR BLD AUTO: 69.8 %
PLATELET # BLD AUTO: 262 10(3)UL (ref 150–450)
PSA SERPL DL<=0.01 NG/ML-MCNC: 26.9 SECONDS (ref 12.4–14.7)
RBC # BLD AUTO: 4.16 X10(6)UL (ref 3.8–5.8)
RED CELL DISTRIBUTION WIDTH-SD: 46 FL (ref 35.1–46.3)
WBC # BLD AUTO: 11.7 X10(3) UL (ref 4–13)

## 2019-01-10 PROCEDURE — 94640 AIRWAY INHALATION TREATMENT: CPT

## 2019-01-10 PROCEDURE — 85610 PROTHROMBIN TIME: CPT | Performed by: SPECIALIST

## 2019-01-10 PROCEDURE — 94664 DEMO&/EVAL PT USE INHALER: CPT

## 2019-01-10 PROCEDURE — 85025 COMPLETE CBC W/AUTO DIFF WBC: CPT | Performed by: INTERNAL MEDICINE

## 2019-01-10 RX ORDER — WARFARIN SODIUM 2.5 MG/1
2.5 TABLET ORAL
Status: DISCONTINUED | OUTPATIENT
Start: 2019-01-10 | End: 2019-01-10

## 2019-01-10 NOTE — PROGRESS NOTES
Pharmacy Dosing Service: Warfarin (Coumadin)  Esau Abebe is a 80year old male with a history of afib (on outpatient coumadin) for whom pharmacy has been dosing warfarin (Coumadin) per consult from Dr. Mook Davis on 1/2/19.  Goal INR is 2-3    Recent La

## 2019-01-10 NOTE — PROGRESS NOTES
[facility]  Progress Note    Lisa Dupree Patient Status:  Inpatient    1935 MRN [id number]   [facility] 2NE-A Attending Marcia Barrera MD   Hosp Day # 9 PCP Cheyenne Lunsford MD         SUBJECTIVE:  Subjective:  Lisa Johnson 106   --   107  101  103  104   --    CO2  32.0   --   29.0  28.0  29.0  29.0   --    BUN  18   --   15  20  28*  39*   --    CREATSERUM  1.27   --   1.27  1.44*  1.51*  1.60*   --    CA  7.7*   --   7.7*  8.3  8.4  8.2*   --    MG  1.7*   --   2.0  2.0 ppm    Elevated bnp ?  chf- iv lasix    Hx cva/ dementia- assist with adls    Azotemia- monitor hydration    dvt px- warfarin per rx    Dc plan      See tests ordered,  Available and radiology reviewed  Discussed with nursing on floor  dvt prophylaxis johnny

## 2019-01-10 NOTE — PLAN OF CARE
Problem: Impaired Swallowing  Goal: Minimize aspiration risk  Interventions:  - Patient should be alert and upright for all feedings (90 degrees preferred)  - Offer food and liquids at a slow rate    - Encourage small bites of food and small sips of liquid indicated  - Manage/alleviate anxiety  - Monitor for signs/symptoms of CO2 retention  Outcome: Progressing  D/c planning back to [facility] per ambulance

## 2019-01-10 NOTE — PLAN OF CARE
Altered Communication/Language Barrier     • Patient/Family is able to understand and participate in their care Progressing           DISCHARGE PLANNING     • Discharge to home or other facility with appropriate resources Progressing           Impa

## 2019-01-10 NOTE — PLAN OF CARE
RESPIRATORY - ADULT    • Achieves optimal ventilation and oxygenation Progressing    On room air with SPO2 maintaining in the 90's.  maintained as much as possible - pt takes if off frequently.

## 2019-01-10 NOTE — PHYSICAL THERAPY NOTE
Attempted to see Pt this AM - KORTNEY Fuentes aware of attempt. Pt impulsive, and returned back to bed. Pt refusing to work with therapist at this time. Will f/u later today if time permits, after all other patients are attempted per tentative schedule.

## 2019-01-10 NOTE — DISCHARGE SUMMARY
[facility]  Discharge Summary    La Farr Patient Status:  Inpatient    1935 MRN [id number]   [facility] 2NE-A Attending Kevin Galloway MD   [facility] Day # 9 PCP Martin Guerrero MD     Date of Admission: 2019    Date of D bowel sounds active all four quadrants,                    Extremities:    no cyanosis, icterus or edema                                  Neurologic :  General weakness, no focal deficits                                                        Data Review: Daily   • Memantine HCl  10 mg Oral BID   • metoprolol succinate  25 mg Oral Daily Beta Blocker   • Pravastatin Sodium  20 mg Oral Nightly   • clotrimazole  1 Application Topical BID      Zolpidem Tartrate, acetaminophen **OR** HYDROcodone-acetaminophen ** nightly. finasteride 5 MG Oral Tab  Take 5 mg by mouth daily. torsemide 20 MG Oral Tab  Take 40 mg by mouth daily. tuberculin 5 UNIT/0.1ML Intradermal Solution  Inject 5 Units into the skin once.  Intradermally one time a day every 12 months  Assoc

## 2019-01-10 NOTE — CM/SW NOTE
01/10/19 1138   Discharge disposition   Expected discharge disposition Skilled Nurs   Name of Facillity/Home Care/Hospice [facility]   [facility], rn to call report to [phone].   FAHEEM s

## 2019-01-10 NOTE — PROGRESS NOTES
NURSING DISCHARGE NOTE    Discharged back to [facility] via Ambulance. Accompanied by Support staff  Belongings Taken by patient/family.  Report given to Asim Freed

## 2019-02-28 VITALS — HEIGHT: 73 IN | HEART RATE: 68 BPM | DIASTOLIC BLOOD PRESSURE: 68 MMHG | SYSTOLIC BLOOD PRESSURE: 110 MMHG

## 2019-02-28 VITALS — DIASTOLIC BLOOD PRESSURE: 64 MMHG | SYSTOLIC BLOOD PRESSURE: 112 MMHG | HEART RATE: 64 BPM

## 2019-03-01 VITALS
DIASTOLIC BLOOD PRESSURE: 68 MMHG | HEART RATE: 64 BPM | HEIGHT: 73 IN | WEIGHT: 205 LBS | SYSTOLIC BLOOD PRESSURE: 124 MMHG | BODY MASS INDEX: 27.17 KG/M2

## 2019-04-02 RX ORDER — PRAVASTATIN SODIUM 40 MG
1 TABLET ORAL NIGHTLY
COMMUNITY
Start: 2011-10-07

## 2019-04-02 RX ORDER — FINASTERIDE 5 MG/1
1 TABLET, FILM COATED ORAL DAILY
COMMUNITY
Start: 2018-06-05

## 2019-04-02 RX ORDER — FLUOXETINE 10 MG/1
1 CAPSULE ORAL DAILY
COMMUNITY
Start: 2018-06-05 | End: 2019-10-09

## 2019-04-02 RX ORDER — ACETAMINOPHEN 325 MG/1
2 TABLET ORAL EVERY 6 HOURS PRN
COMMUNITY
Start: 2018-09-26 | End: 2019-10-02

## 2019-04-02 RX ORDER — AMLODIPINE BESYLATE 5 MG/1
1 TABLET ORAL DAILY
COMMUNITY
Start: 2015-04-07 | End: 2022-01-01

## 2019-04-02 RX ORDER — LISINOPRIL 10 MG/1
10 TABLET ORAL DAILY
COMMUNITY
Start: 2011-02-04 | End: 2022-01-01 | Stop reason: ALTCHOICE

## 2019-04-02 RX ORDER — METOPROLOL SUCCINATE 25 MG/1
1 TABLET, EXTENDED RELEASE ORAL DAILY
COMMUNITY
Start: 2018-09-26

## 2019-04-02 RX ORDER — WARFARIN SODIUM 5 MG/1
TABLET ORAL
COMMUNITY
Start: 2018-09-26 | End: 2019-10-09 | Stop reason: DRUGHIGH

## 2019-04-02 RX ORDER — POTASSIUM CHLORIDE 20 MEQ/1
TABLET, EXTENDED RELEASE ORAL
COMMUNITY
Start: 2018-09-26

## 2019-04-02 RX ORDER — ASPIRIN 81 MG
TABLET, DELAYED RELEASE (ENTERIC COATED) ORAL
COMMUNITY

## 2019-04-02 RX ORDER — DONEPEZIL HYDROCHLORIDE 10 MG/1
1 TABLET, FILM COATED ORAL NIGHTLY
COMMUNITY
Start: 2011-10-07

## 2019-04-24 ENCOUNTER — ANCILLARY PROCEDURE (OUTPATIENT)
Dept: CARDIOLOGY | Age: 84
End: 2019-04-24
Attending: INTERNAL MEDICINE

## 2019-04-24 DIAGNOSIS — Z95.0 CARDIAC PACEMAKER: ICD-10-CM

## 2019-06-07 ENCOUNTER — APPOINTMENT (OUTPATIENT)
Dept: CARDIOLOGY | Age: 84
End: 2019-06-07
Attending: INTERNAL MEDICINE

## 2019-06-11 LAB
ABSOLUTE IMMATURE GRANULOCYTES (OFFPRE24): NORMAL
ALBUMIN SERPL-MCNC: 3.5 G/DL
ALBUMIN/GLOB SERPL: NORMAL {RATIO}
ALP SERPL-CCNC: 71 U/L
ALT SERPL-CCNC: 22 U/L
ANION GAP SERPL CALC-SCNC: NORMAL MMOL/L
AST SERPL-CCNC: 21 U/L
BASO+EOS+MONOS # BLD: NORMAL 10*3/UL
BASO+EOS+MONOS NFR BLD: NORMAL %
BASOPHILS # BLD: NORMAL 10*3/UL
BASOPHILS NFR BLD: NORMAL %
BILIRUB SERPL-MCNC: 0.4 MG/DL
BUN SERPL-MCNC: 18 MG/DL
BUN/CREAT SERPL: NORMAL
CALCIUM SERPL-MCNC: 8.5 MG/DL
CHLORIDE SERPL-SCNC: 103 MMOL/L
CO2 SERPL-SCNC: NORMAL MMOL/L
CREAT SERPL-MCNC: 1.4 MG/DL
DIFFERENTIAL METHOD BLD: NORMAL
EOSINOPHIL # BLD: NORMAL 10*3/UL
EOSINOPHIL NFR BLD: NORMAL %
ERYTHROCYTE [DISTWIDTH] IN BLOOD: NORMAL %
GLOBULIN SER-MCNC: NORMAL G/DL
GLUCOSE SERPL-MCNC: 96 MG/DL
HCT VFR BLD CALC: 38.5 %
HGB BLD-MCNC: 12 G/DL
IMMATURE GRANULOCYTES (OFFPRE25): NORMAL
INR PPP: 2.8 (ref 2–3)
LENGTH OF FAST TIME PATIENT: NORMAL H
LYMPHOCYTES # BLD: NORMAL 10*3/UL
LYMPHOCYTES NFR BLD: NORMAL %
MCH RBC QN AUTO: NORMAL PG
MCHC RBC AUTO-ENTMCNC: NORMAL G/DL
MCV RBC AUTO: NORMAL FL
MONOCYTES # BLD: NORMAL 10*3/UL
MONOCYTES NFR BLD: NORMAL %
MPV (OFFPRE2): NORMAL
NEUTROPHILS # BLD: NORMAL 10*3/UL
NEUTROPHILS NFR BLD: NORMAL %
NORMAL CONTROL: NORMAL
NRBC BLD MANUAL-RTO: NORMAL %
PATIENT ON COUMADIN Y/N: NORMAL
PLAT MORPH BLD: NORMAL
PLATELET # BLD: 178 10*3/UL
POTASSIUM SERPL-SCNC: 3.8 MMOL/L
PROT SERPL-MCNC: 6 G/DL
PROTHROMBIN TIME: 34 S
RBC # BLD: 4 10*6/UL
RBC MORPH BLD: NORMAL
SODIUM SERPL-SCNC: 145 MMOL/L
WBC # BLD: 7.8 10*3/UL
WBC MORPH BLD: NORMAL

## 2019-06-18 ENCOUNTER — ANCILLARY PROCEDURE (OUTPATIENT)
Dept: CARDIOLOGY | Age: 84
End: 2019-06-18
Attending: INTERNAL MEDICINE

## 2019-06-18 ENCOUNTER — OFFICE VISIT (OUTPATIENT)
Dept: CARDIOLOGY | Age: 84
End: 2019-06-18

## 2019-06-18 VITALS — SYSTOLIC BLOOD PRESSURE: 110 MMHG | HEART RATE: 70 BPM | DIASTOLIC BLOOD PRESSURE: 74 MMHG

## 2019-06-18 DIAGNOSIS — I63.9 CEREBROVASCULAR ACCIDENT (CVA), UNSPECIFIED MECHANISM (CMD): ICD-10-CM

## 2019-06-18 DIAGNOSIS — Z95.0 PACEMAKER: ICD-10-CM
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
## 2019-06-18 DIAGNOSIS — I48.21 PERMANENT ATRIAL FIBRILLATION (CMD): Primary | ICD-10-CM

## 2019-06-18 PROCEDURE — 99215 OFFICE O/P EST HI 40 MIN: CPT | Performed by: INTERNAL MEDICINE

## 2019-06-18 PROCEDURE — 93280 PM DEVICE PROGR EVAL DUAL: CPT | Performed by: INTERNAL MEDICINE

## 2019-06-18 ASSESSMENT — ENCOUNTER SYMPTOMS
HEMATOCHEZIA: 0
COUGH: 0
ALLERGIC/IMMUNOLOGIC COMMENTS: NO NEW FOOD ALLERGIES
CHILLS: 0
WEIGHT GAIN: 0
BRUISES/BLEEDS EASILY: 0
SUSPICIOUS LESIONS: 0
WEIGHT LOSS: 0
FEVER: 0
HEMOPTYSIS: 0

## 2019-06-19 ENCOUNTER — CLINICAL ABSTRACT (OUTPATIENT)
Dept: CARDIOLOGY | Age: 84
End: 2019-06-19

## 2019-07-01 ENCOUNTER — APPOINTMENT (OUTPATIENT)
Dept: CARDIOLOGY | Age: 84
End: 2019-07-01
Attending: INTERNAL MEDICINE

## 2019-09-09 ENCOUNTER — APPOINTMENT (OUTPATIENT)
Dept: CARDIOLOGY | Age: 84
End: 2019-09-09

## 2019-09-26 ENCOUNTER — APPOINTMENT (OUTPATIENT)
Dept: CARDIOLOGY | Age: 84
End: 2019-09-26

## 2019-10-08 LAB
ABSOLUTE IMMATURE GRANULOCYTES (OFFPRE24): NORMAL
ALBUMIN SERPL-MCNC: 3.4 G/DL
ALBUMIN/GLOB SERPL: NORMAL {RATIO}
ALP SERPL-CCNC: 72 U/L
ALT SERPL-CCNC: 25 U/L
ANION GAP SERPL CALC-SCNC: 10 MMOL/L
AST SERPL-CCNC: 18 U/L
BASO+EOS+MONOS # BLD: NORMAL 10*3/UL
BASO+EOS+MONOS NFR BLD: NORMAL %
BASOPHILS # BLD: NORMAL 10*3/UL
BASOPHILS NFR BLD: NORMAL %
BILIRUB SERPL-MCNC: 0.4 MG/DL
BUN SERPL-MCNC: 28 MG/DL
BUN/CREAT SERPL: NORMAL
CALCIUM SERPL-MCNC: 8.1 MG/DL
CHLORIDE SERPL-SCNC: 106 MMOL/L
CO2 SERPL-SCNC: 30 MMOL/L
CREAT SERPL-MCNC: 1.7 MG/DL
DIFFERENTIAL METHOD BLD: NORMAL
EOSINOPHIL # BLD: NORMAL 10*3/UL
EOSINOPHIL NFR BLD: NORMAL %
ERYTHROCYTE [DISTWIDTH] IN BLOOD: NORMAL %
GLOBULIN SER-MCNC: NORMAL G/DL
GLUCOSE SERPL-MCNC: 91 MG/DL
HCT VFR BLD CALC: 39.9 %
HGB BLD-MCNC: 12.5 G/DL
IMMATURE GRANULOCYTES (OFFPRE25): NORMAL
INR PPP: 1.9 (ref 2–3)
LENGTH OF FAST TIME PATIENT: NORMAL H
LYMPHOCYTES # BLD: NORMAL 10*3/UL
LYMPHOCYTES NFR BLD: NORMAL %
MCH RBC QN AUTO: NORMAL PG
MCHC RBC AUTO-ENTMCNC: NORMAL G/DL
MCV RBC AUTO: NORMAL FL
MONOCYTES # BLD: NORMAL 10*3/UL
MONOCYTES NFR BLD: NORMAL %
MPV (OFFPRE2): NORMAL
NEUTROPHILS # BLD: NORMAL 10*3/UL
NEUTROPHILS NFR BLD: NORMAL %
NORMAL CONTROL: ABNORMAL
NRBC BLD MANUAL-RTO: NORMAL %
PATIENT ON COUMADIN Y/N: ABNORMAL
PLAT MORPH BLD: NORMAL
PLATELET # BLD: 309 10*3/UL
POTASSIUM SERPL-SCNC: 4.2 MMOL/L
PROT SERPL-MCNC: 6.1 G/DL
PROTHROMBIN TIME: 22.7 S
RBC # BLD: 4.2 10*6/UL
RBC MORPH BLD: NORMAL
SODIUM SERPL-SCNC: 146 MMOL/L
WBC # BLD: 8.9 10*3/UL
WBC MORPH BLD: NORMAL

## 2019-10-22 LAB
INR PPP: 2 (ref 2–3)
NORMAL CONTROL: NORMAL
PATIENT ON COUMADIN Y/N: NORMAL
PROTHROMBIN TIME: 24.1 S

## 2019-10-23 ENCOUNTER — OFFICE VISIT (OUTPATIENT)
Dept: CARDIOLOGY | Age: 84
End: 2019-10-23

## 2019-10-23 VITALS
BODY MASS INDEX: 25.45 KG/M2 | HEART RATE: 64 BPM | WEIGHT: 192 LBS | DIASTOLIC BLOOD PRESSURE: 50 MMHG | SYSTOLIC BLOOD PRESSURE: 90 MMHG | HEIGHT: 73 IN

## 2019-10-23 DIAGNOSIS — I63.139 CEREBROVASCULAR ACCIDENT (CVA) DUE TO EMBOLISM OF CAROTID ARTERY, UNSPECIFIED BLOOD VESSEL LATERALITY (CMD): ICD-10-CM

## 2019-10-23 DIAGNOSIS — Z95.0 PACEMAKER: ICD-10-CM

## 2019-10-23 DIAGNOSIS — I48.20 CHRONIC ATRIAL FIBRILLATION (CMD): Primary | ICD-10-CM

## 2019-10-23 PROBLEM — I63.9 CEREBROVASCULAR ACCIDENT (CVA) DUE TO EMBOLISM (CMD): Status: ACTIVE | Noted: 2019-10-23

## 2019-10-23 PROBLEM — I48.91 ATRIAL FIBRILLATION (CMD): Status: ACTIVE | Noted: 2019-10-23

## 2019-10-23 PROCEDURE — 99215 OFFICE O/P EST HI 40 MIN: CPT | Performed by: INTERNAL MEDICINE

## 2019-10-23 RX ORDER — WARFARIN SODIUM 2 MG/1
2 TABLET ORAL DAILY
COMMUNITY
End: 2022-01-01

## 2019-10-23 RX ORDER — MEMANTINE HYDROCHLORIDE 28 MG/1
28 CAPSULE, EXTENDED RELEASE ORAL DAILY
COMMUNITY
Start: 2018-09-26

## 2019-10-23 RX ORDER — LEVOTHYROXINE SODIUM 0.05 MG/1
50 TABLET ORAL DAILY
COMMUNITY

## 2019-10-23 RX ORDER — LEVOTHYROXINE SODIUM 0.03 MG/1
TABLET ORAL
COMMUNITY
Start: 2019-09-07 | End: 2019-10-09 | Stop reason: DRUGHIGH

## 2019-10-23 RX ORDER — DIPHENOXYLATE HYDROCHLORIDE AND ATROPINE SULFATE 2.5; .025 MG/1; MG/1
TABLET ORAL
COMMUNITY
Start: 2011-02-04

## 2019-10-23 ASSESSMENT — PATIENT HEALTH QUESTIONNAIRE - PHQ9
1. LITTLE INTEREST OR PLEASURE IN DOING THINGS: NOT AT ALL
2. FEELING DOWN, DEPRESSED OR HOPELESS: NOT AT ALL
SUM OF ALL RESPONSES TO PHQ9 QUESTIONS 1 AND 2: 0
SUM OF ALL RESPONSES TO PHQ9 QUESTIONS 1 AND 2: 0

## 2019-10-24 ENCOUNTER — CLINICAL ABSTRACT (OUTPATIENT)
Dept: CARDIOLOGY | Age: 84
End: 2019-10-24

## 2019-11-04 ENCOUNTER — ANCILLARY PROCEDURE (OUTPATIENT)
Dept: CARDIOLOGY | Age: 84
End: 2019-11-04
Attending: INTERNAL MEDICINE

## 2019-11-04 DIAGNOSIS — Z95.0 CARDIAC PACEMAKER: ICD-10-CM

## 2019-11-05 PROCEDURE — 93294 REM INTERROG EVL PM/LDLS PM: CPT | Performed by: INTERNAL MEDICINE

## 2019-12-03 ENCOUNTER — OFFICE VISIT (OUTPATIENT)
Dept: CARDIOLOGY | Age: 84
End: 2019-12-03

## 2019-12-03 VITALS
DIASTOLIC BLOOD PRESSURE: 70 MMHG | SYSTOLIC BLOOD PRESSURE: 122 MMHG | HEART RATE: 76 BPM | HEIGHT: 72 IN | WEIGHT: 192 LBS | BODY MASS INDEX: 26.01 KG/M2

## 2019-12-03 DIAGNOSIS — I48.91 ATRIAL FIBRILLATION, UNSPECIFIED TYPE (CMD): ICD-10-CM

## 2019-12-03 DIAGNOSIS — Z45.018 ADJUSTMENT AND MANAGEMENT OF CARDIAC PACEMAKER: Primary | ICD-10-CM

## 2019-12-03 PROCEDURE — 99215 OFFICE O/P EST HI 40 MIN: CPT | Performed by: INTERNAL MEDICINE

## 2019-12-03 ASSESSMENT — ENCOUNTER SYMPTOMS
SUSPICIOUS LESIONS: 0
HEMATOCHEZIA: 0
WEIGHT LOSS: 0
ALLERGIC/IMMUNOLOGIC COMMENTS: NO NEW FOOD ALLERGIES
BRUISES/BLEEDS EASILY: 0
HEMOPTYSIS: 0
FEVER: 0
COUGH: 0
WEIGHT GAIN: 0
CHILLS: 0

## 2019-12-03 ASSESSMENT — PATIENT HEALTH QUESTIONNAIRE - PHQ9
SUM OF ALL RESPONSES TO PHQ9 QUESTIONS 1 AND 2: 0
SUM OF ALL RESPONSES TO PHQ9 QUESTIONS 1 AND 2: 0
1. LITTLE INTEREST OR PLEASURE IN DOING THINGS: NOT AT ALL
2. FEELING DOWN, DEPRESSED OR HOPELESS: NOT AT ALL

## 2020-02-08 ENCOUNTER — ANCILLARY PROCEDURE (OUTPATIENT)
Dept: CARDIOLOGY | Age: 85
End: 2020-02-08
Attending: INTERNAL MEDICINE

## 2020-02-08 ENCOUNTER — ANCILLARY ORDERS (OUTPATIENT)
Dept: CARDIOLOGY | Age: 85
End: 2020-02-08

## 2020-02-08 DIAGNOSIS — Z95.0 CARDIAC PACEMAKER: ICD-10-CM

## 2020-02-08 PROCEDURE — X1114 CARDIAC DEVICE HOME CHECK - REMOTE UNSCHEDULED: HCPCS | Performed by: INTERNAL MEDICINE

## 2020-02-10 PROCEDURE — 93294 REM INTERROG EVL PM/LDLS PM: CPT | Performed by: INTERNAL MEDICINE

## 2020-08-26 ENCOUNTER — TELEPHONE (OUTPATIENT)
Dept: CARDIOLOGY | Age: 85
End: 2020-08-26

## 2020-08-26 ENCOUNTER — ANCILLARY ORDERS (OUTPATIENT)
Dept: CARDIOLOGY | Age: 85
End: 2020-08-26

## 2020-08-26 ENCOUNTER — ANCILLARY PROCEDURE (OUTPATIENT)
Dept: CARDIOLOGY | Age: 85
End: 2020-08-26
Attending: INTERNAL MEDICINE

## 2020-08-26 DIAGNOSIS — Z95.0 CARDIAC PACEMAKER: ICD-10-CM

## 2020-08-26 PROCEDURE — X1114 CARDIAC DEVICE HOME CHECK - REMOTE UNSCHEDULED: HCPCS | Performed by: INTERNAL MEDICINE

## 2020-09-26 PROCEDURE — 93294 REM INTERROG EVL PM/LDLS PM: CPT | Performed by: INTERNAL MEDICINE

## 2020-11-30 ENCOUNTER — TELEPHONE (OUTPATIENT)
Dept: CARDIOLOGY | Age: 85
End: 2020-11-30

## 2020-12-03 ENCOUNTER — APPOINTMENT (OUTPATIENT)
Dept: CARDIOLOGY | Age: 85
End: 2020-12-03

## 2020-12-03 ENCOUNTER — APPOINTMENT (OUTPATIENT)
Dept: CARDIOLOGY | Age: 85
End: 2020-12-03
Attending: INTERNAL MEDICINE

## 2020-12-04 ENCOUNTER — ANCILLARY ORDERS (OUTPATIENT)
Dept: CARDIOLOGY | Age: 85
End: 2020-12-04

## 2020-12-04 ENCOUNTER — ANCILLARY PROCEDURE (OUTPATIENT)
Dept: CARDIOLOGY | Age: 85
End: 2020-12-04
Attending: INTERNAL MEDICINE

## 2020-12-04 DIAGNOSIS — Z95.0 CARDIAC PACEMAKER: ICD-10-CM

## 2020-12-04 PROCEDURE — X1114 CARDIAC DEVICE HOME CHECK - REMOTE UNSCHEDULED: HCPCS | Performed by: INTERNAL MEDICINE

## 2021-02-04 ENCOUNTER — APPOINTMENT (OUTPATIENT)
Dept: CARDIOLOGY | Age: 86
End: 2021-02-04
Attending: INTERNAL MEDICINE

## 2021-03-15 ENCOUNTER — TELEPHONE (OUTPATIENT)
Dept: CARDIOLOGY | Age: 86
End: 2021-03-15

## 2021-03-18 ENCOUNTER — TELEPHONIC VISIT (OUTPATIENT)
Dept: CARDIOLOGY | Age: 86
End: 2021-03-18

## 2021-03-18 DIAGNOSIS — Z45.018 ADJUSTMENT AND MANAGEMENT OF CARDIAC PACEMAKER: ICD-10-CM

## 2021-03-18 DIAGNOSIS — I48.20 CHRONIC ATRIAL FIBRILLATION (CMD): Primary | ICD-10-CM

## 2021-03-18 PROCEDURE — 99441 TELEPHONE E&M BY PHYSICIAN EST PT NOT ORIG PREV 7 DAYS 5-10 MIN: CPT | Performed by: INTERNAL MEDICINE

## 2021-03-21 ENCOUNTER — ANCILLARY ORDERS (OUTPATIENT)
Dept: CARDIOLOGY | Age: 86
End: 2021-03-21

## 2021-03-21 ENCOUNTER — TELEPHONE (OUTPATIENT)
Dept: CARDIOLOGY | Age: 86
End: 2021-03-21

## 2021-03-21 ENCOUNTER — ANCILLARY PROCEDURE (OUTPATIENT)
Dept: CARDIOLOGY | Age: 86
End: 2021-03-21
Attending: INTERNAL MEDICINE

## 2021-03-21 DIAGNOSIS — Z95.0 CARDIAC PACEMAKER IN SITU: ICD-10-CM

## 2021-03-21 PROCEDURE — X1114 CARDIAC DEVICE HOME CHECK - REMOTE UNSCHEDULED: HCPCS | Performed by: INTERNAL MEDICINE

## 2021-03-21 PROCEDURE — 93294 REM INTERROG EVL PM/LDLS PM: CPT | Performed by: INTERNAL MEDICINE

## 2021-03-30 ENCOUNTER — TELEPHONE (OUTPATIENT)
Dept: CARDIOLOGY | Age: 86
End: 2021-03-30

## 2021-06-25 ENCOUNTER — TELEPHONE (OUTPATIENT)
Dept: CARDIOLOGY | Age: 86
End: 2021-06-25

## 2021-06-25 ENCOUNTER — ANCILLARY PROCEDURE (OUTPATIENT)
Dept: CARDIOLOGY | Age: 86
End: 2021-06-25
Attending: INTERNAL MEDICINE

## 2021-06-25 ENCOUNTER — ANCILLARY ORDERS (OUTPATIENT)
Dept: CARDIOLOGY | Age: 86
End: 2021-06-25

## 2021-06-25 DIAGNOSIS — Z45.018 PACEMAKER REPROGRAMMING/CHECK: ICD-10-CM

## 2021-06-25 DIAGNOSIS — Z45.018 ENCOUNTER FOR CARE OF PACEMAKER: ICD-10-CM

## 2021-06-25 PROCEDURE — 93294 REM INTERROG EVL PM/LDLS PM: CPT | Performed by: INTERNAL MEDICINE

## 2021-06-25 PROCEDURE — X1114 CARDIAC DEVICE HOME CHECK - REMOTE UNSCHEDULED: HCPCS | Performed by: INTERNAL MEDICINE

## 2021-08-13 ENCOUNTER — TELEPHONE (OUTPATIENT)
Dept: CARDIOLOGY | Age: 86
End: 2021-08-13

## 2021-10-01 ENCOUNTER — ANCILLARY ORDERS (OUTPATIENT)
Dept: CARDIOLOGY | Age: 86
End: 2021-10-01

## 2021-10-01 ENCOUNTER — ANCILLARY PROCEDURE (OUTPATIENT)
Dept: CARDIOLOGY | Age: 86
End: 2021-10-01
Attending: INTERNAL MEDICINE

## 2021-10-01 DIAGNOSIS — Z95.0 PACEMAKER: ICD-10-CM

## 2021-10-01 PROCEDURE — X1114 CARDIAC DEVICE HOME CHECK - REMOTE UNSCHEDULED: HCPCS | Performed by: INTERNAL MEDICINE

## 2022-01-01 ENCOUNTER — APPOINTMENT (OUTPATIENT)
Dept: CT IMAGING | Facility: HOSPITAL | Age: 87
End: 2022-01-01
Payer: MEDICARE

## 2022-01-01 ENCOUNTER — NURSE ONLY (OUTPATIENT)
Dept: ELECTROPHYSIOLOGY | Facility: HOSPITAL | Age: 87
DRG: 871 | End: 2022-01-01
Attending: HOSPITALIST
Payer: MEDICARE

## 2022-01-01 ENCOUNTER — EXTERNAL RECORD (OUTPATIENT)
Dept: OTHER | Age: 87
End: 2022-01-01

## 2022-01-01 ENCOUNTER — APPOINTMENT (OUTPATIENT)
Dept: CV DIAGNOSTICS | Facility: HOSPITAL | Age: 87
End: 2022-01-01
Attending: NURSE PRACTITIONER
Payer: MEDICARE

## 2022-01-01 ENCOUNTER — APPOINTMENT (OUTPATIENT)
Dept: CARDIOLOGY | Age: 87
End: 2022-01-01

## 2022-01-01 ENCOUNTER — ANCILLARY ORDERS (OUTPATIENT)
Dept: CARDIOLOGY | Age: 87
End: 2022-01-01

## 2022-01-01 ENCOUNTER — APPOINTMENT (OUTPATIENT)
Dept: ULTRASOUND IMAGING | Facility: HOSPITAL | Age: 87
End: 2022-01-01
Attending: NURSE PRACTITIONER
Payer: MEDICARE

## 2022-01-01 ENCOUNTER — APPOINTMENT (OUTPATIENT)
Dept: GENERAL RADIOLOGY | Facility: HOSPITAL | Age: 87
End: 2022-01-01
Attending: INTERNAL MEDICINE
Payer: MEDICARE

## 2022-01-01 ENCOUNTER — APPOINTMENT (OUTPATIENT)
Dept: GENERAL RADIOLOGY | Facility: HOSPITAL | Age: 87
End: 2022-01-01
Attending: EMERGENCY MEDICINE
Payer: MEDICARE

## 2022-01-01 ENCOUNTER — OFFICE VISIT (OUTPATIENT)
Dept: CARDIOLOGY | Age: 87
End: 2022-01-01

## 2022-01-01 ENCOUNTER — APPOINTMENT (OUTPATIENT)
Dept: GENERAL RADIOLOGY | Facility: HOSPITAL | Age: 87
End: 2022-01-01
Attending: HOSPITALIST
Payer: MEDICARE

## 2022-01-01 ENCOUNTER — HOSPITAL ENCOUNTER (INPATIENT)
Facility: HOSPITAL | Age: 87
LOS: 13 days | Discharge: HOSPICE | End: 2022-01-01
Attending: EMERGENCY MEDICINE | Admitting: HOSPITALIST
Payer: MEDICARE

## 2022-01-01 ENCOUNTER — APPOINTMENT (OUTPATIENT)
Dept: GENERAL RADIOLOGY | Facility: HOSPITAL | Age: 87
End: 2022-01-01
Attending: NURSE PRACTITIONER
Payer: MEDICARE

## 2022-01-01 ENCOUNTER — ANCILLARY PROCEDURE (OUTPATIENT)
Dept: CARDIOLOGY | Age: 87
End: 2022-01-01
Attending: INTERNAL MEDICINE

## 2022-01-01 ENCOUNTER — TELEPHONE (OUTPATIENT)
Dept: CARDIOLOGY | Age: 87
End: 2022-01-01

## 2022-01-01 ENCOUNTER — HOSPITAL ENCOUNTER (INPATIENT)
Facility: HOSPITAL | Age: 87
LOS: 5 days | End: 2022-01-01
Attending: HOSPITALIST | Admitting: HOSPITALIST
Payer: OTHER MISCELLANEOUS

## 2022-01-01 ENCOUNTER — APPOINTMENT (OUTPATIENT)
Dept: ULTRASOUND IMAGING | Facility: HOSPITAL | Age: 87
End: 2022-01-01
Attending: HOSPITALIST
Payer: MEDICARE

## 2022-01-01 ENCOUNTER — NURSE ONLY (OUTPATIENT)
Dept: ELECTROPHYSIOLOGY | Facility: HOSPITAL | Age: 87
End: 2022-01-01
Attending: HOSPITALIST
Payer: MEDICARE

## 2022-01-01 VITALS
DIASTOLIC BLOOD PRESSURE: 55 MMHG | TEMPERATURE: 98 F | RESPIRATION RATE: 28 BRPM | SYSTOLIC BLOOD PRESSURE: 118 MMHG | HEART RATE: 63 BPM | HEIGHT: 73 IN | WEIGHT: 191.56 LBS | OXYGEN SATURATION: 97 % | BODY MASS INDEX: 25.39 KG/M2

## 2022-01-01 VITALS — DIASTOLIC BLOOD PRESSURE: 67 MMHG | HEART RATE: 57 BPM | SYSTOLIC BLOOD PRESSURE: 125 MMHG

## 2022-01-01 VITALS
HEART RATE: 79 BPM | TEMPERATURE: 98 F | RESPIRATION RATE: 20 BRPM | DIASTOLIC BLOOD PRESSURE: 61 MMHG | OXYGEN SATURATION: 66 % | SYSTOLIC BLOOD PRESSURE: 138 MMHG

## 2022-01-01 VITALS — DIASTOLIC BLOOD PRESSURE: 48 MMHG | SYSTOLIC BLOOD PRESSURE: 83 MMHG

## 2022-01-01 VITALS — HEART RATE: 60 BPM | DIASTOLIC BLOOD PRESSURE: 70 MMHG | SYSTOLIC BLOOD PRESSURE: 106 MMHG

## 2022-01-01 DIAGNOSIS — I48.20 CHRONIC ATRIAL FIBRILLATION (CMD): ICD-10-CM

## 2022-01-01 DIAGNOSIS — Z95.0 CARDIAC PACEMAKER: ICD-10-CM

## 2022-01-01 DIAGNOSIS — Z95.0 PACEMAKER: Primary | ICD-10-CM

## 2022-01-01 DIAGNOSIS — E87.3 METABOLIC ALKALOSIS: Primary | ICD-10-CM

## 2022-01-01 DIAGNOSIS — E87.1 HYPONATREMIA: ICD-10-CM

## 2022-01-01 DIAGNOSIS — R31.0 GROSS HEMATURIA: ICD-10-CM

## 2022-01-01 DIAGNOSIS — Z95.0 PACEMAKER: ICD-10-CM

## 2022-01-01 DIAGNOSIS — I48.20 CHRONIC ATRIAL FIBRILLATION (CMD): Primary | ICD-10-CM

## 2022-01-01 DIAGNOSIS — E87.29 RESPIRATORY ACIDOSIS: ICD-10-CM

## 2022-01-01 DIAGNOSIS — I95.9 HYPOTENSION, UNSPECIFIED HYPOTENSION TYPE: ICD-10-CM

## 2022-01-01 DIAGNOSIS — Z95.0 CARDIAC PACEMAKER IN SITU: ICD-10-CM

## 2022-01-01 DIAGNOSIS — J81.0 ACUTE PULMONARY EDEMA (HCC): ICD-10-CM

## 2022-01-01 DIAGNOSIS — Z45.018 PACEMAKER REPROGRAMMING/CHECK: ICD-10-CM

## 2022-01-01 DIAGNOSIS — R06.89 CO2 NARCOSIS: ICD-10-CM

## 2022-01-01 DIAGNOSIS — Z95.0 PRESENCE OF CARDIAC PACEMAKER: Primary | ICD-10-CM

## 2022-01-01 DIAGNOSIS — Z45.018 ADJUSTMENT AND MANAGEMENT OF CARDIAC PACEMAKER: ICD-10-CM

## 2022-01-01 LAB
ADENOVIRUS PCR:: NOT DETECTED
ALBUMIN SERPL-MCNC: 1.6 G/DL (ref 3.4–5)
ALBUMIN SERPL-MCNC: 1.7 G/DL (ref 3.4–5)
ALBUMIN SERPL-MCNC: 1.8 G/DL (ref 3.4–5)
ALBUMIN SERPL-MCNC: 1.9 G/DL (ref 3.4–5)
ALBUMIN SERPL-MCNC: 1.9 G/DL (ref 3.4–5)
ALBUMIN SERPL-MCNC: 2 G/DL (ref 3.4–5)
ALBUMIN SERPL-MCNC: 2.7 G/DL (ref 3.4–5)
ALBUMIN/GLOB SERPL: 0.4 {RATIO} (ref 1–2)
ALBUMIN/GLOB SERPL: 0.4 {RATIO} (ref 1–2)
ALBUMIN/GLOB SERPL: 0.5 {RATIO} (ref 1–2)
ALBUMIN/GLOB SERPL: 0.6 {RATIO} (ref 1–2)
ALBUMIN/GLOB SERPL: 0.7 {RATIO} (ref 1–2)
ALP LIVER SERPL-CCNC: 57 U/L
ALP LIVER SERPL-CCNC: 59 U/L
ALP LIVER SERPL-CCNC: 59 U/L
ALP LIVER SERPL-CCNC: 61 U/L
ALP LIVER SERPL-CCNC: 63 U/L
ALP LIVER SERPL-CCNC: 63 U/L
ALP LIVER SERPL-CCNC: 95 U/L
ALT SERPL-CCNC: 12 U/L
ALT SERPL-CCNC: 13 U/L
ALT SERPL-CCNC: 13 U/L
ALT SERPL-CCNC: 15 U/L
ALT SERPL-CCNC: 23 U/L
ANION GAP SERPL CALC-SCNC: 1 MMOL/L (ref 0–18)
ANION GAP SERPL CALC-SCNC: 2 MMOL/L (ref 0–18)
ANION GAP SERPL CALC-SCNC: 3 MMOL/L (ref 0–18)
ANION GAP SERPL CALC-SCNC: 4 MMOL/L (ref 0–18)
ANION GAP SERPL CALC-SCNC: 5 MMOL/L (ref 0–18)
ANION GAP SERPL CALC-SCNC: 6 MMOL/L (ref 0–18)
ANTIBODY SCREEN: NEGATIVE
APTT PPP: 37.9 SECONDS (ref 23.3–35.6)
ARTERIAL PATENCY WRIST A: POSITIVE
AST SERPL-CCNC: 17 U/L (ref 15–37)
AST SERPL-CCNC: 17 U/L (ref 15–37)
AST SERPL-CCNC: 19 U/L (ref 15–37)
AST SERPL-CCNC: 20 U/L (ref 15–37)
AST SERPL-CCNC: 23 U/L (ref 15–37)
AST SERPL-CCNC: 26 U/L (ref 15–37)
AST SERPL-CCNC: 27 U/L (ref 15–37)
ATRIAL RATE: 234 BPM
B PARAPERT DNA SPEC QL NAA+PROBE: NOT DETECTED
B PERT DNA SPEC QL NAA+PROBE: NOT DETECTED
BASE EXCESS BLDA CALC-SCNC: -0.5 MMOL/L (ref ?–2)
BASE EXCESS BLDA CALC-SCNC: -0.5 MMOL/L (ref ?–2)
BASE EXCESS BLDA CALC-SCNC: 0.6 MMOL/L (ref ?–2)
BASE EXCESS BLDA CALC-SCNC: 0.7 MMOL/L (ref ?–2)
BASE EXCESS BLDA CALC-SCNC: 3.3 MMOL/L (ref ?–2)
BASE EXCESS BLDA CALC-SCNC: 3.3 MMOL/L (ref ?–2)
BASE EXCESS BLDA CALC-SCNC: 7.3 MMOL/L (ref ?–2)
BASOPHILS # BLD AUTO: 0.02 X10(3) UL (ref 0–0.2)
BASOPHILS # BLD AUTO: 0.02 X10(3) UL (ref 0–0.2)
BASOPHILS # BLD AUTO: 0.03 X10(3) UL (ref 0–0.2)
BASOPHILS # BLD AUTO: 0.04 X10(3) UL (ref 0–0.2)
BASOPHILS # BLD AUTO: 0.05 X10(3) UL (ref 0–0.2)
BASOPHILS # BLD AUTO: 0.05 X10(3) UL (ref 0–0.2)
BASOPHILS NFR BLD AUTO: 0.2 %
BASOPHILS NFR BLD AUTO: 0.2 %
BASOPHILS NFR BLD AUTO: 0.3 %
BASOPHILS NFR BLD AUTO: 0.4 %
BASOPHILS NFR BLD AUTO: 0.4 %
BASOPHILS NFR BLD AUTO: 0.5 %
BILIRUB SERPL-MCNC: 0.2 MG/DL (ref 0.1–2)
BILIRUB SERPL-MCNC: 0.3 MG/DL (ref 0.1–2)
BILIRUB SERPL-MCNC: 0.3 MG/DL (ref 0.1–2)
BILIRUB SERPL-MCNC: 0.4 MG/DL (ref 0.1–2)
BILIRUB SERPL-MCNC: 0.4 MG/DL (ref 0.1–2)
BILIRUB UR QL CFM: NEGATIVE
BLOOD TYPE BARCODE: 6200
BODY TEMPERATURE: 98.6 F
BUN BLD-MCNC: 25 MG/DL (ref 7–18)
BUN BLD-MCNC: 26 MG/DL (ref 7–18)
BUN BLD-MCNC: 27 MG/DL (ref 7–18)
BUN BLD-MCNC: 27 MG/DL (ref 7–18)
BUN BLD-MCNC: 28 MG/DL (ref 7–18)
BUN BLD-MCNC: 29 MG/DL (ref 7–18)
BUN BLD-MCNC: 30 MG/DL (ref 7–18)
BUN BLD-MCNC: 31 MG/DL (ref 7–18)
BUN BLD-MCNC: 32 MG/DL (ref 7–18)
BUN BLD-MCNC: 32 MG/DL (ref 7–18)
BUN BLD-MCNC: 33 MG/DL (ref 7–18)
BUN BLD-MCNC: 33 MG/DL (ref 7–18)
BUN BLD-MCNC: 44 MG/DL (ref 7–18)
BUN BLD-MCNC: 54 MG/DL (ref 7–18)
C PNEUM DNA SPEC QL NAA+PROBE: NOT DETECTED
CA-I BLD-SCNC: 1.08 MMOL/L (ref 0.95–1.32)
CA-I BLD-SCNC: 1.09 MMOL/L (ref 0.95–1.32)
CA-I BLD-SCNC: 1.1 MMOL/L (ref 0.95–1.32)
CA-I BLD-SCNC: 1.1 MMOL/L (ref 0.95–1.32)
CALCIUM BLD-MCNC: 7.6 MG/DL (ref 8.5–10.1)
CALCIUM BLD-MCNC: 7.7 MG/DL (ref 8.5–10.1)
CALCIUM BLD-MCNC: 7.9 MG/DL (ref 8.5–10.1)
CALCIUM BLD-MCNC: 7.9 MG/DL (ref 8.5–10.1)
CALCIUM BLD-MCNC: 8 MG/DL (ref 8.5–10.1)
CALCIUM BLD-MCNC: 8.1 MG/DL (ref 8.5–10.1)
CALCIUM BLD-MCNC: 8.2 MG/DL (ref 8.5–10.1)
CALCIUM BLD-MCNC: 8.3 MG/DL (ref 8.5–10.1)
CALCIUM BLD-MCNC: 8.3 MG/DL (ref 8.5–10.1)
CHLORIDE SERPL-SCNC: 105 MMOL/L (ref 98–112)
CHLORIDE SERPL-SCNC: 107 MMOL/L (ref 98–112)
CHLORIDE SERPL-SCNC: 108 MMOL/L (ref 98–112)
CHLORIDE SERPL-SCNC: 110 MMOL/L (ref 98–112)
CHLORIDE SERPL-SCNC: 110 MMOL/L (ref 98–112)
CHLORIDE SERPL-SCNC: 112 MMOL/L (ref 98–112)
CHLORIDE SERPL-SCNC: 113 MMOL/L (ref 98–112)
CHLORIDE SERPL-SCNC: 116 MMOL/L (ref 98–112)
CHLORIDE SERPL-SCNC: 116 MMOL/L (ref 98–112)
CHLORIDE SERPL-SCNC: 119 MMOL/L (ref 98–112)
CO2 SERPL-SCNC: 26 MMOL/L (ref 21–32)
CO2 SERPL-SCNC: 27 MMOL/L (ref 21–32)
CO2 SERPL-SCNC: 28 MMOL/L (ref 21–32)
CO2 SERPL-SCNC: 29 MMOL/L (ref 21–32)
CO2 SERPL-SCNC: 30 MMOL/L (ref 21–32)
CO2 SERPL-SCNC: 31 MMOL/L (ref 21–32)
COHGB MFR BLD: 0.9 % SAT (ref 0–3)
COHGB MFR BLD: 0.9 % SAT (ref 0–3)
COHGB MFR BLD: 1.3 % SAT (ref 0–3)
COHGB MFR BLD: 1.4 % SAT (ref 0–3)
COHGB MFR BLD: 1.5 % SAT (ref 0–3)
COHGB MFR BLD: 1.7 % SAT (ref 0–3)
COHGB MFR BLD: 2 % SAT (ref 0–3)
CORONAVIRUS 229E PCR:: NOT DETECTED
CORONAVIRUS HKU1 PCR:: NOT DETECTED
CORONAVIRUS NL63 PCR:: NOT DETECTED
CORONAVIRUS OC43 PCR:: NOT DETECTED
CREAT BLD-MCNC: 1.2 MG/DL
CREAT BLD-MCNC: 1.28 MG/DL
CREAT BLD-MCNC: 1.46 MG/DL
CREAT BLD-MCNC: 1.46 MG/DL
CREAT BLD-MCNC: 1.54 MG/DL
CREAT BLD-MCNC: 1.62 MG/DL
CREAT BLD-MCNC: 1.67 MG/DL
CREAT BLD-MCNC: 1.68 MG/DL
CREAT BLD-MCNC: 1.68 MG/DL
CREAT BLD-MCNC: 1.79 MG/DL
CREAT BLD-MCNC: 1.8 MG/DL
CREAT BLD-MCNC: 1.98 MG/DL
CREAT BLD-MCNC: 2.01 MG/DL
CREAT BLD-MCNC: 2.54 MG/DL
CREAT UR-SCNC: 91.9 MG/DL
EOSINOPHIL # BLD AUTO: 0.05 X10(3) UL (ref 0–0.7)
EOSINOPHIL # BLD AUTO: 0.08 X10(3) UL (ref 0–0.7)
EOSINOPHIL # BLD AUTO: 0.12 X10(3) UL (ref 0–0.7)
EOSINOPHIL # BLD AUTO: 0.13 X10(3) UL (ref 0–0.7)
EOSINOPHIL # BLD AUTO: 0.16 X10(3) UL (ref 0–0.7)
EOSINOPHIL # BLD AUTO: 0.17 X10(3) UL (ref 0–0.7)
EOSINOPHIL # BLD AUTO: 0.21 X10(3) UL (ref 0–0.7)
EOSINOPHIL # BLD AUTO: 0.26 X10(3) UL (ref 0–0.7)
EOSINOPHIL # BLD AUTO: 0.29 X10(3) UL (ref 0–0.7)
EOSINOPHIL # BLD AUTO: 0.3 X10(3) UL (ref 0–0.7)
EOSINOPHIL # BLD AUTO: 0.3 X10(3) UL (ref 0–0.7)
EOSINOPHIL NFR BLD AUTO: 0.3 %
EOSINOPHIL NFR BLD AUTO: 0.6 %
EOSINOPHIL NFR BLD AUTO: 1.1 %
EOSINOPHIL NFR BLD AUTO: 1.3 %
EOSINOPHIL NFR BLD AUTO: 1.4 %
EOSINOPHIL NFR BLD AUTO: 1.9 %
EOSINOPHIL NFR BLD AUTO: 1.9 %
EOSINOPHIL NFR BLD AUTO: 3.4 %
EOSINOPHIL NFR BLD AUTO: 3.4 %
EOSINOPHIL NFR BLD AUTO: 3.7 %
EOSINOPHIL NFR BLD AUTO: 4.4 %
ERYTHROCYTE [DISTWIDTH] IN BLOOD BY AUTOMATED COUNT: 15 %
ERYTHROCYTE [DISTWIDTH] IN BLOOD BY AUTOMATED COUNT: 15.4 %
ERYTHROCYTE [DISTWIDTH] IN BLOOD BY AUTOMATED COUNT: 15.4 %
ERYTHROCYTE [DISTWIDTH] IN BLOOD BY AUTOMATED COUNT: 15.5 %
ERYTHROCYTE [DISTWIDTH] IN BLOOD BY AUTOMATED COUNT: 15.5 %
ERYTHROCYTE [DISTWIDTH] IN BLOOD BY AUTOMATED COUNT: 15.6 %
ERYTHROCYTE [DISTWIDTH] IN BLOOD BY AUTOMATED COUNT: 15.7 %
ERYTHROCYTE [DISTWIDTH] IN BLOOD BY AUTOMATED COUNT: 15.9 %
ERYTHROCYTE [DISTWIDTH] IN BLOOD BY AUTOMATED COUNT: 16 %
ERYTHROCYTE [DISTWIDTH] IN BLOOD BY AUTOMATED COUNT: 16 %
ERYTHROCYTE [DISTWIDTH] IN BLOOD BY AUTOMATED COUNT: 16.3 %
EXPIRATORY PRESSURE: 5 CM H2O
EXPIRATORY PRESSURE: 6 CM H2O
FIO2: 35 %
FIO2: 35 %
FIO2: 40 %
FIO2: 40 %
FLUAV RNA SPEC QL NAA+PROBE: NOT DETECTED
FLUBV RNA SPEC QL NAA+PROBE: NOT DETECTED
GFR SERPLBLD BASED ON 1.73 SQ M-ARVRAT: 24 ML/MIN/1.73M2 (ref 60–?)
GFR SERPLBLD BASED ON 1.73 SQ M-ARVRAT: 32 ML/MIN/1.73M2 (ref 60–?)
GFR SERPLBLD BASED ON 1.73 SQ M-ARVRAT: 32 ML/MIN/1.73M2 (ref 60–?)
GFR SERPLBLD BASED ON 1.73 SQ M-ARVRAT: 36 ML/MIN/1.73M2 (ref 60–?)
GFR SERPLBLD BASED ON 1.73 SQ M-ARVRAT: 36 ML/MIN/1.73M2 (ref 60–?)
GFR SERPLBLD BASED ON 1.73 SQ M-ARVRAT: 39 ML/MIN/1.73M2 (ref 60–?)
GFR SERPLBLD BASED ON 1.73 SQ M-ARVRAT: 41 ML/MIN/1.73M2 (ref 60–?)
GFR SERPLBLD BASED ON 1.73 SQ M-ARVRAT: 43 ML/MIN/1.73M2 (ref 60–?)
GFR SERPLBLD BASED ON 1.73 SQ M-ARVRAT: 46 ML/MIN/1.73M2 (ref 60–?)
GFR SERPLBLD BASED ON 1.73 SQ M-ARVRAT: 46 ML/MIN/1.73M2 (ref 60–?)
GFR SERPLBLD BASED ON 1.73 SQ M-ARVRAT: 54 ML/MIN/1.73M2 (ref 60–?)
GFR SERPLBLD BASED ON 1.73 SQ M-ARVRAT: 59 ML/MIN/1.73M2 (ref 60–?)
GLOBULIN PLAS-MCNC: 3.2 G/DL (ref 2.8–4.4)
GLOBULIN PLAS-MCNC: 3.3 G/DL (ref 2.8–4.4)
GLOBULIN PLAS-MCNC: 3.5 G/DL (ref 2.8–4.4)
GLOBULIN PLAS-MCNC: 3.7 G/DL (ref 2.8–4.4)
GLOBULIN PLAS-MCNC: 3.9 G/DL (ref 2.8–4.4)
GLOBULIN PLAS-MCNC: 4 G/DL (ref 2.8–4.4)
GLOBULIN PLAS-MCNC: 4.1 G/DL (ref 2.8–4.4)
GLUCOSE BLD-MCNC: 107 MG/DL (ref 70–99)
GLUCOSE BLD-MCNC: 110 MG/DL (ref 70–99)
GLUCOSE BLD-MCNC: 111 MG/DL (ref 70–99)
GLUCOSE BLD-MCNC: 113 MG/DL (ref 70–99)
GLUCOSE BLD-MCNC: 114 MG/DL (ref 70–99)
GLUCOSE BLD-MCNC: 115 MG/DL (ref 70–99)
GLUCOSE BLD-MCNC: 115 MG/DL (ref 70–99)
GLUCOSE BLD-MCNC: 117 MG/DL (ref 70–99)
GLUCOSE BLD-MCNC: 118 MG/DL (ref 70–99)
GLUCOSE BLD-MCNC: 118 MG/DL (ref 70–99)
GLUCOSE BLD-MCNC: 119 MG/DL (ref 70–99)
GLUCOSE BLD-MCNC: 120 MG/DL (ref 70–99)
GLUCOSE BLD-MCNC: 121 MG/DL (ref 70–99)
GLUCOSE BLD-MCNC: 122 MG/DL (ref 70–99)
GLUCOSE BLD-MCNC: 122 MG/DL (ref 70–99)
GLUCOSE BLD-MCNC: 125 MG/DL (ref 70–99)
GLUCOSE BLD-MCNC: 126 MG/DL (ref 70–99)
GLUCOSE BLD-MCNC: 129 MG/DL (ref 70–99)
GLUCOSE BLD-MCNC: 130 MG/DL (ref 70–99)
GLUCOSE BLD-MCNC: 131 MG/DL (ref 70–99)
GLUCOSE BLD-MCNC: 132 MG/DL (ref 70–99)
GLUCOSE BLD-MCNC: 133 MG/DL (ref 70–99)
GLUCOSE BLD-MCNC: 134 MG/DL (ref 70–99)
GLUCOSE BLD-MCNC: 135 MG/DL (ref 70–99)
GLUCOSE BLD-MCNC: 137 MG/DL (ref 70–99)
GLUCOSE BLD-MCNC: 139 MG/DL (ref 70–99)
GLUCOSE BLD-MCNC: 141 MG/DL (ref 70–99)
GLUCOSE BLD-MCNC: 143 MG/DL (ref 70–99)
GLUCOSE BLD-MCNC: 144 MG/DL (ref 70–99)
GLUCOSE BLD-MCNC: 144 MG/DL (ref 70–99)
GLUCOSE BLD-MCNC: 148 MG/DL (ref 70–99)
GLUCOSE BLD-MCNC: 149 MG/DL (ref 70–99)
GLUCOSE BLD-MCNC: 155 MG/DL (ref 70–99)
GLUCOSE BLD-MCNC: 155 MG/DL (ref 70–99)
GLUCOSE BLD-MCNC: 158 MG/DL (ref 70–99)
GLUCOSE BLD-MCNC: 159 MG/DL (ref 70–99)
GLUCOSE BLD-MCNC: 159 MG/DL (ref 70–99)
GLUCOSE BLD-MCNC: 162 MG/DL (ref 70–99)
GLUCOSE BLD-MCNC: 171 MG/DL (ref 70–99)
GLUCOSE BLD-MCNC: 177 MG/DL (ref 70–99)
GLUCOSE BLD-MCNC: 179 MG/DL (ref 70–99)
GLUCOSE BLD-MCNC: 182 MG/DL (ref 70–99)
GLUCOSE BLD-MCNC: 192 MG/DL (ref 70–99)
GLUCOSE BLD-MCNC: 89 MG/DL (ref 70–99)
GLUCOSE BLD-MCNC: 90 MG/DL (ref 70–99)
GLUCOSE BLD-MCNC: 94 MG/DL (ref 70–99)
GLUCOSE BLD-MCNC: 96 MG/DL (ref 70–99)
GLUCOSE BLD-MCNC: 98 MG/DL (ref 70–99)
GLUCOSE UR STRIP.AUTO-MCNC: 250 MG/DL
HCO3 BLDA-SCNC: 24.6 MEQ/L (ref 21–27)
HCO3 BLDA-SCNC: 24.6 MEQ/L (ref 21–27)
HCO3 BLDA-SCNC: 25.4 MEQ/L (ref 21–27)
HCO3 BLDA-SCNC: 25.5 MEQ/L (ref 21–27)
HCO3 BLDA-SCNC: 27.4 MEQ/L (ref 21–27)
HCO3 BLDA-SCNC: 27.5 MEQ/L (ref 21–27)
HCO3 BLDA-SCNC: 30.6 MEQ/L (ref 21–27)
HCT VFR BLD AUTO: 22.2 %
HCT VFR BLD AUTO: 23.2 %
HCT VFR BLD AUTO: 23.8 %
HCT VFR BLD AUTO: 25 %
HCT VFR BLD AUTO: 25.1 %
HCT VFR BLD AUTO: 25.3 %
HCT VFR BLD AUTO: 25.5 %
HCT VFR BLD AUTO: 25.7 %
HCT VFR BLD AUTO: 26.3 %
HCT VFR BLD AUTO: 28.4 %
HCT VFR BLD AUTO: 30 %
HCT VFR BLD AUTO: 31 %
HCT VFR BLD AUTO: 38.1 %
HCT VFR BLD AUTO: 41.1 %
HGB BLD-MCNC: 10.3 G/DL
HGB BLD-MCNC: 11.5 G/DL
HGB BLD-MCNC: 12 G/DL
HGB BLD-MCNC: 12.3 G/DL
HGB BLD-MCNC: 12.8 G/DL
HGB BLD-MCNC: 13.3 G/DL
HGB BLD-MCNC: 6.8 G/DL
HGB BLD-MCNC: 7.2 G/DL
HGB BLD-MCNC: 7.3 G/DL
HGB BLD-MCNC: 7.7 G/DL
HGB BLD-MCNC: 7.8 G/DL
HGB BLD-MCNC: 7.9 G/DL
HGB BLD-MCNC: 8 G/DL
HGB BLD-MCNC: 8.1 G/DL
HGB BLD-MCNC: 8.7 G/DL
HGB BLD-MCNC: 9.1 G/DL
HGB BLD-MCNC: 9.5 G/DL
HGB BLD-MCNC: 9.8 G/DL
HGB BLD-MCNC: 9.8 G/DL
IMM GRANULOCYTES # BLD AUTO: 0.05 X10(3) UL (ref 0–1)
IMM GRANULOCYTES # BLD AUTO: 0.06 X10(3) UL (ref 0–1)
IMM GRANULOCYTES # BLD AUTO: 0.07 X10(3) UL (ref 0–1)
IMM GRANULOCYTES # BLD AUTO: 0.08 X10(3) UL (ref 0–1)
IMM GRANULOCYTES # BLD AUTO: 0.09 X10(3) UL (ref 0–1)
IMM GRANULOCYTES # BLD AUTO: 0.11 X10(3) UL (ref 0–1)
IMM GRANULOCYTES # BLD AUTO: 0.12 X10(3) UL (ref 0–1)
IMM GRANULOCYTES # BLD AUTO: 0.13 X10(3) UL (ref 0–1)
IMM GRANULOCYTES NFR BLD: 0.6 %
IMM GRANULOCYTES NFR BLD: 0.7 %
IMM GRANULOCYTES NFR BLD: 0.8 %
IMM GRANULOCYTES NFR BLD: 0.9 %
IMM GRANULOCYTES NFR BLD: 0.9 %
IMM GRANULOCYTES NFR BLD: 1 %
IMM GRANULOCYTES NFR BLD: 1 %
IMM GRANULOCYTES NFR BLD: 1.4 %
INR BLD: 1.37 (ref 0.85–1.16)
INSP PRESSURE: 14 CM H2O
INSP PRESSURE: 14 CM H2O
IPAP MAX: 30 CM H2O
IPAP MAX: 30 CM H2O
IPAP MIN: 20 CM H2O
IPAP MIN: 20 CM H2O
L/M: 10 L/MIN
L/M: 4 L/MIN
LACTATE BLD-SCNC: 0.7 MMOL/L (ref 0.5–2)
LACTATE BLD-SCNC: 0.8 MMOL/L (ref 0.5–2)
LACTATE BLD-SCNC: 1.2 MMOL/L (ref 0.5–2)
LACTATE BLD-SCNC: 1.8 MMOL/L (ref 0.5–2)
LACTATE SERPL-SCNC: 1.8 MMOL/L (ref 0.4–2)
LYMPHOCYTES # BLD AUTO: 1.32 X10(3) UL (ref 1–4)
LYMPHOCYTES # BLD AUTO: 1.47 X10(3) UL (ref 1–4)
LYMPHOCYTES # BLD AUTO: 1.5 X10(3) UL (ref 1–4)
LYMPHOCYTES # BLD AUTO: 1.52 X10(3) UL (ref 1–4)
LYMPHOCYTES # BLD AUTO: 1.55 X10(3) UL (ref 1–4)
LYMPHOCYTES # BLD AUTO: 1.58 X10(3) UL (ref 1–4)
LYMPHOCYTES # BLD AUTO: 1.68 X10(3) UL (ref 1–4)
LYMPHOCYTES # BLD AUTO: 1.86 X10(3) UL (ref 1–4)
LYMPHOCYTES # BLD AUTO: 1.96 X10(3) UL (ref 1–4)
LYMPHOCYTES # BLD AUTO: 1.96 X10(3) UL (ref 1–4)
LYMPHOCYTES # BLD AUTO: 2.12 X10(3) UL (ref 1–4)
LYMPHOCYTES NFR BLD AUTO: 11.5 %
LYMPHOCYTES NFR BLD AUTO: 13.8 %
LYMPHOCYTES NFR BLD AUTO: 16 %
LYMPHOCYTES NFR BLD AUTO: 16.8 %
LYMPHOCYTES NFR BLD AUTO: 17.1 %
LYMPHOCYTES NFR BLD AUTO: 17.8 %
LYMPHOCYTES NFR BLD AUTO: 18.2 %
LYMPHOCYTES NFR BLD AUTO: 18.3 %
LYMPHOCYTES NFR BLD AUTO: 18.8 %
LYMPHOCYTES NFR BLD AUTO: 35.9 %
LYMPHOCYTES NFR BLD AUTO: 9.3 %
MAGNESIUM SERPL-MCNC: 2.3 MG/DL (ref 1.6–2.6)
MAGNESIUM SERPL-MCNC: 2.4 MG/DL (ref 1.6–2.6)
MAGNESIUM SERPL-MCNC: 2.5 MG/DL (ref 1.6–2.6)
MAGNESIUM SERPL-MCNC: 2.5 MG/DL (ref 1.6–2.6)
MAGNESIUM SERPL-MCNC: 2.6 MG/DL (ref 1.6–2.6)
MAGNESIUM SERPL-MCNC: 2.8 MG/DL (ref 1.6–2.6)
MCH RBC QN AUTO: 29.7 PG (ref 26–34)
MCH RBC QN AUTO: 29.7 PG (ref 26–34)
MCH RBC QN AUTO: 29.8 PG (ref 26–34)
MCH RBC QN AUTO: 30.1 PG (ref 26–34)
MCH RBC QN AUTO: 30.3 PG (ref 26–34)
MCH RBC QN AUTO: 30.4 PG (ref 26–34)
MCH RBC QN AUTO: 30.5 PG (ref 26–34)
MCH RBC QN AUTO: 30.8 PG (ref 26–34)
MCH RBC QN AUTO: 30.9 PG (ref 26–34)
MCH RBC QN AUTO: 31.6 PG (ref 26–34)
MCHC RBC AUTO-ENTMCNC: 30.2 G/DL (ref 31–37)
MCHC RBC AUTO-ENTMCNC: 30.3 G/DL (ref 31–37)
MCHC RBC AUTO-ENTMCNC: 30.6 G/DL (ref 31–37)
MCHC RBC AUTO-ENTMCNC: 30.7 G/DL (ref 31–37)
MCHC RBC AUTO-ENTMCNC: 30.8 G/DL (ref 31–37)
MCHC RBC AUTO-ENTMCNC: 31 G/DL (ref 31–37)
MCHC RBC AUTO-ENTMCNC: 31.1 G/DL (ref 31–37)
MCHC RBC AUTO-ENTMCNC: 31.1 G/DL (ref 31–37)
MCHC RBC AUTO-ENTMCNC: 31.2 G/DL (ref 31–37)
MCHC RBC AUTO-ENTMCNC: 31.5 G/DL (ref 31–37)
MCHC RBC AUTO-ENTMCNC: 31.6 G/DL (ref 31–37)
MCHC RBC AUTO-ENTMCNC: 31.6 G/DL (ref 31–37)
MCHC RBC AUTO-ENTMCNC: 31.8 G/DL (ref 31–37)
MCV RBC AUTO: 100 FL
MCV RBC AUTO: 101.7 FL
MCV RBC AUTO: 96.2 FL
MCV RBC AUTO: 96.6 FL
MCV RBC AUTO: 97 FL
MCV RBC AUTO: 97 FL
MCV RBC AUTO: 97.3 FL
MCV RBC AUTO: 97.7 FL
MCV RBC AUTO: 97.8 FL
MCV RBC AUTO: 98 FL
MCV RBC AUTO: 98.5 FL
MCV RBC AUTO: 98.8 FL
MCV RBC AUTO: 99 FL
MCV RBC AUTO: 99.6 FL
MCV RBC AUTO: 99.6 FL
METAPNEUMOVIRUS PCR:: NOT DETECTED
METHGB MFR BLD: 0 % SAT (ref 0.4–1.5)
METHGB MFR BLD: 0.3 % SAT (ref 0.4–1.5)
METHGB MFR BLD: 0.4 % SAT (ref 0.4–1.5)
METHGB MFR BLD: 0.5 % SAT (ref 0.4–1.5)
METHGB MFR BLD: 0.6 % SAT (ref 0.4–1.5)
METHGB MFR BLD: 0.7 % SAT (ref 0.4–1.5)
METHGB MFR BLD: 0.8 % SAT (ref 0.4–1.5)
MONOCYTES # BLD AUTO: 0.42 X10(3) UL (ref 0.1–1)
MONOCYTES # BLD AUTO: 1.02 X10(3) UL (ref 0.1–1)
MONOCYTES # BLD AUTO: 1.1 X10(3) UL (ref 0.1–1)
MONOCYTES # BLD AUTO: 1.13 X10(3) UL (ref 0.1–1)
MONOCYTES # BLD AUTO: 1.16 X10(3) UL (ref 0.1–1)
MONOCYTES # BLD AUTO: 1.2 X10(3) UL (ref 0.1–1)
MONOCYTES # BLD AUTO: 1.26 X10(3) UL (ref 0.1–1)
MONOCYTES # BLD AUTO: 1.28 X10(3) UL (ref 0.1–1)
MONOCYTES # BLD AUTO: 1.3 X10(3) UL (ref 0.1–1)
MONOCYTES # BLD AUTO: 1.32 X10(3) UL (ref 0.1–1)
MONOCYTES # BLD AUTO: 1.43 X10(3) UL (ref 0.1–1)
MONOCYTES NFR BLD AUTO: 11.4 %
MONOCYTES NFR BLD AUTO: 12.3 %
MONOCYTES NFR BLD AUTO: 12.6 %
MONOCYTES NFR BLD AUTO: 12.9 %
MONOCYTES NFR BLD AUTO: 13.4 %
MONOCYTES NFR BLD AUTO: 14 %
MONOCYTES NFR BLD AUTO: 15.5 %
MONOCYTES NFR BLD AUTO: 7.1 %
MONOCYTES NFR BLD AUTO: 7.5 %
MONOCYTES NFR BLD AUTO: 8.3 %
MONOCYTES NFR BLD AUTO: 9.6 %
MYCOPLASMA PNEUMONIA PCR:: NOT DETECTED
NEUTROPHILS # BLD AUTO: 10.12 X10 (3) UL (ref 1.5–7.7)
NEUTROPHILS # BLD AUTO: 10.12 X10(3) UL (ref 1.5–7.7)
NEUTROPHILS # BLD AUTO: 12.73 X10 (3) UL (ref 1.5–7.7)
NEUTROPHILS # BLD AUTO: 12.73 X10(3) UL (ref 1.5–7.7)
NEUTROPHILS # BLD AUTO: 13.66 X10 (3) UL (ref 1.5–7.7)
NEUTROPHILS # BLD AUTO: 13.66 X10(3) UL (ref 1.5–7.7)
NEUTROPHILS # BLD AUTO: 3.03 X10 (3) UL (ref 1.5–7.7)
NEUTROPHILS # BLD AUTO: 3.03 X10(3) UL (ref 1.5–7.7)
NEUTROPHILS # BLD AUTO: 5.14 X10 (3) UL (ref 1.5–7.7)
NEUTROPHILS # BLD AUTO: 5.14 X10(3) UL (ref 1.5–7.7)
NEUTROPHILS # BLD AUTO: 5.46 X10 (3) UL (ref 1.5–7.7)
NEUTROPHILS # BLD AUTO: 5.46 X10(3) UL (ref 1.5–7.7)
NEUTROPHILS # BLD AUTO: 5.55 X10 (3) UL (ref 1.5–7.7)
NEUTROPHILS # BLD AUTO: 5.55 X10(3) UL (ref 1.5–7.7)
NEUTROPHILS # BLD AUTO: 5.83 X10 (3) UL (ref 1.5–7.7)
NEUTROPHILS # BLD AUTO: 5.83 X10(3) UL (ref 1.5–7.7)
NEUTROPHILS # BLD AUTO: 5.93 X10 (3) UL (ref 1.5–7.7)
NEUTROPHILS # BLD AUTO: 5.93 X10(3) UL (ref 1.5–7.7)
NEUTROPHILS # BLD AUTO: 6.09 X10 (3) UL (ref 1.5–7.7)
NEUTROPHILS # BLD AUTO: 6.09 X10(3) UL (ref 1.5–7.7)
NEUTROPHILS # BLD AUTO: 7.57 X10 (3) UL (ref 1.5–7.7)
NEUTROPHILS # BLD AUTO: 7.57 X10(3) UL (ref 1.5–7.7)
NEUTROPHILS NFR BLD AUTO: 51.3 %
NEUTROPHILS NFR BLD AUTO: 63.5 %
NEUTROPHILS NFR BLD AUTO: 64.2 %
NEUTROPHILS NFR BLD AUTO: 64.3 %
NEUTROPHILS NFR BLD AUTO: 65.2 %
NEUTROPHILS NFR BLD AUTO: 66.4 %
NEUTROPHILS NFR BLD AUTO: 67.3 %
NEUTROPHILS NFR BLD AUTO: 68.5 %
NEUTROPHILS NFR BLD AUTO: 75 %
NEUTROPHILS NFR BLD AUTO: 79.8 %
NEUTROPHILS NFR BLD AUTO: 80.1 %
NT-PROBNP SERPL-MCNC: 1322 PG/ML (ref ?–450)
OSMOLALITY SERPL CALC.SUM OF ELEC: 304 MOSM/KG (ref 275–295)
OSMOLALITY SERPL CALC.SUM OF ELEC: 308 MOSM/KG (ref 275–295)
OSMOLALITY SERPL CALC.SUM OF ELEC: 309 MOSM/KG (ref 275–295)
OSMOLALITY SERPL CALC.SUM OF ELEC: 310 MOSM/KG (ref 275–295)
OSMOLALITY SERPL CALC.SUM OF ELEC: 312 MOSM/KG (ref 275–295)
OSMOLALITY SERPL CALC.SUM OF ELEC: 314 MOSM/KG (ref 275–295)
OSMOLALITY SERPL CALC.SUM OF ELEC: 318 MOSM/KG (ref 275–295)
OSMOLALITY UR: 381 MOSM/KG (ref 300–1300)
OXYHGB MFR BLDA: 94.5 % (ref 92–100)
OXYHGB MFR BLDA: 96.5 % (ref 92–100)
OXYHGB MFR BLDA: 97 % (ref 92–100)
OXYHGB MFR BLDA: 97.2 % (ref 92–100)
OXYHGB MFR BLDA: 97.3 % (ref 92–100)
OXYHGB MFR BLDA: 97.6 % (ref 92–100)
OXYHGB MFR BLDA: 98.5 % (ref 92–100)
PARAINFLUENZA 1 PCR:: NOT DETECTED
PARAINFLUENZA 2 PCR:: NOT DETECTED
PARAINFLUENZA 3 PCR:: NOT DETECTED
PARAINFLUENZA 4 PCR:: NOT DETECTED
PCO2 BLDA: 42 MM HG (ref 35–45)
PCO2 BLDA: 44 MM HG (ref 35–45)
PCO2 BLDA: 48 MM HG (ref 35–45)
PCO2 BLDA: 49 MM HG (ref 35–45)
PCO2 BLDA: 52 MM HG (ref 35–45)
PCO2 BLDA: 55 MM HG (ref 35–45)
PCO2 BLDA: 69 MM HG (ref 35–45)
PH BLDA: 7.31 [PH] (ref 7.35–7.45)
PH BLDA: 7.31 [PH] (ref 7.35–7.45)
PH BLDA: 7.32 [PH] (ref 7.35–7.45)
PH BLDA: 7.33 [PH] (ref 7.35–7.45)
PH BLDA: 7.38 [PH] (ref 7.35–7.45)
PH BLDA: 7.39 [PH] (ref 7.35–7.45)
PH BLDA: 7.43 [PH] (ref 7.35–7.45)
PH UR STRIP.AUTO: 8.5 [PH] (ref 5–8)
PHOSPHATE SERPL-MCNC: 2.7 MG/DL (ref 2.5–4.9)
PHOSPHATE SERPL-MCNC: 2.8 MG/DL (ref 2.5–4.9)
PHOSPHATE SERPL-MCNC: 2.9 MG/DL (ref 2.5–4.9)
PHOSPHATE SERPL-MCNC: 2.9 MG/DL (ref 2.5–4.9)
PHOSPHATE SERPL-MCNC: 3 MG/DL (ref 2.5–4.9)
PHOSPHATE SERPL-MCNC: 3 MG/DL (ref 2.5–4.9)
PHOSPHATE SERPL-MCNC: 3.3 MG/DL (ref 2.5–4.9)
PHOSPHATE SERPL-MCNC: 4 MG/DL (ref 2.5–4.9)
PLATELET # BLD AUTO: 115 10(3)UL (ref 150–450)
PLATELET # BLD AUTO: 118 10(3)UL (ref 150–450)
PLATELET # BLD AUTO: 121 10(3)UL (ref 150–450)
PLATELET # BLD AUTO: 126 10(3)UL (ref 150–450)
PLATELET # BLD AUTO: 134 10(3)UL (ref 150–450)
PLATELET # BLD AUTO: 134 10(3)UL (ref 150–450)
PLATELET # BLD AUTO: 163 10(3)UL (ref 150–450)
PLATELET # BLD AUTO: 175 10(3)UL (ref 150–450)
PLATELET # BLD AUTO: 180 10(3)UL (ref 150–450)
PLATELET # BLD AUTO: 201 10(3)UL (ref 150–450)
PLATELET # BLD AUTO: 230 10(3)UL (ref 150–450)
PLATELET # BLD AUTO: 245 10(3)UL (ref 150–450)
PLATELET # BLD AUTO: 283 10(3)UL (ref 150–450)
PLATELET # BLD AUTO: 356 10(3)UL (ref 150–450)
PLATELET # BLD AUTO: 393 10(3)UL (ref 150–450)
PO2 BLDA: 107 MM HG (ref 80–100)
PO2 BLDA: 117 MM HG (ref 80–100)
PO2 BLDA: 125 MM HG (ref 80–100)
PO2 BLDA: 74 MM HG (ref 80–100)
PO2 BLDA: 91 MM HG (ref 80–100)
PO2 BLDA: 92 MM HG (ref 80–100)
PO2 BLDA: 93 MM HG (ref 80–100)
POTASSIUM BLD-SCNC: 3.9 MMOL/L (ref 3.6–5.1)
POTASSIUM BLD-SCNC: 4.3 MMOL/L (ref 3.6–5.1)
POTASSIUM BLD-SCNC: 4.5 MMOL/L (ref 3.6–5.1)
POTASSIUM BLD-SCNC: 4.6 MMOL/L (ref 3.6–5.1)
POTASSIUM SERPL-SCNC: 3.3 MMOL/L (ref 3.5–5.1)
POTASSIUM SERPL-SCNC: 3.5 MMOL/L (ref 3.5–5.1)
POTASSIUM SERPL-SCNC: 3.7 MMOL/L (ref 3.5–5.1)
POTASSIUM SERPL-SCNC: 3.7 MMOL/L (ref 3.5–5.1)
POTASSIUM SERPL-SCNC: 3.8 MMOL/L (ref 3.5–5.1)
POTASSIUM SERPL-SCNC: 3.9 MMOL/L (ref 3.5–5.1)
POTASSIUM SERPL-SCNC: 4 MMOL/L (ref 3.5–5.1)
POTASSIUM SERPL-SCNC: 4.2 MMOL/L (ref 3.5–5.1)
POTASSIUM SERPL-SCNC: 4.3 MMOL/L (ref 3.5–5.1)
POTASSIUM SERPL-SCNC: 4.4 MMOL/L (ref 3.5–5.1)
POTASSIUM SERPL-SCNC: 4.6 MMOL/L (ref 3.5–5.1)
POTASSIUM SERPL-SCNC: 4.6 MMOL/L (ref 3.5–5.1)
PROCALCITONIN SERPL-MCNC: <0.05 NG/ML (ref ?–0.16)
PROT SERPL-MCNC: 5.1 G/DL (ref 6.4–8.2)
PROT SERPL-MCNC: 5.2 G/DL (ref 6.4–8.2)
PROT SERPL-MCNC: 5.3 G/DL (ref 6.4–8.2)
PROT SERPL-MCNC: 5.5 G/DL (ref 6.4–8.2)
PROT SERPL-MCNC: 5.7 G/DL (ref 6.4–8.2)
PROT SERPL-MCNC: 5.8 G/DL (ref 6.4–8.2)
PROT SERPL-MCNC: 6.7 G/DL (ref 6.4–8.2)
PROT UR STRIP.AUTO-MCNC: >=300 MG/DL
PROTHROMBIN TIME: 16.9 SECONDS (ref 11.6–14.8)
Q-T INTERVAL: 524 MS
QRS DURATION: 188 MS
QTC CALCULATION (BEZET): 527 MS
R AXIS: 242 DEGREES
RBC # BLD AUTO: 2.23 X10(6)UL
RBC # BLD AUTO: 2.33 X10(6)UL
RBC # BLD AUTO: 2.41 X10(6)UL
RBC # BLD AUTO: 2.56 X10(6)UL
RBC # BLD AUTO: 2.56 X10(6)UL
RBC # BLD AUTO: 2.59 X10(6)UL
RBC # BLD AUTO: 2.63 X10(6)UL
RBC # BLD AUTO: 2.66 X10(6)UL
RBC # BLD AUTO: 2.69 X10(6)UL
RBC # BLD AUTO: 2.92 X10(6)UL
RBC # BLD AUTO: 2.95 X10(6)UL
RBC # BLD AUTO: 3.1 X10(6)UL
RBC # BLD AUTO: 4.15 X10(6)UL
RBC #/AREA URNS AUTO: >10 /HPF
RBC #/AREA URNS AUTO: >10 /HPF
RH BLOOD TYPE: POSITIVE
RHINOVIRUS/ENTERO PCR:: NOT DETECTED
RSV RNA SPEC QL NAA+PROBE: NOT DETECTED
SARS-COV-2 RNA NPH QL NAA+NON-PROBE: NOT DETECTED
SARS-COV-2 RNA RESP QL NAA+PROBE: NOT DETECTED
SODIUM BLD-SCNC: 144 MMOL/L (ref 135–145)
SODIUM BLD-SCNC: 146 MMOL/L (ref 135–145)
SODIUM SERPL-SCNC: 140 MMOL/L (ref 136–145)
SODIUM SERPL-SCNC: 141 MMOL/L (ref 136–145)
SODIUM SERPL-SCNC: 142 MMOL/L (ref 136–145)
SODIUM SERPL-SCNC: 144 MMOL/L (ref 136–145)
SODIUM SERPL-SCNC: 145 MMOL/L (ref 136–145)
SODIUM SERPL-SCNC: 145 MMOL/L (ref 136–145)
SODIUM SERPL-SCNC: 146 MMOL/L (ref 136–145)
SODIUM SERPL-SCNC: 147 MMOL/L (ref 136–145)
SODIUM SERPL-SCNC: 147 MMOL/L (ref 136–145)
SODIUM SERPL-SCNC: 148 MMOL/L (ref 136–145)
SODIUM SERPL-SCNC: 151 MMOL/L (ref 136–145)
SODIUM SERPL-SCNC: 62 MMOL/L
SP GR UR STRIP.AUTO: 1.02 (ref 1–1.03)
T AXIS: 76 DEGREES
T4 FREE SERPL-MCNC: 0.8 NG/DL (ref 0.8–1.7)
TIDAL VOLUME: 500 ML
TIDAL VOLUME: 500 ML
TROPONIN I HIGH SENSITIVITY: 26 NG/L
TROPONIN I HIGH SENSITIVITY: 32 NG/L
TSI SER-ACNC: 1.68 MIU/ML (ref 0.36–3.74)
UROBILINOGEN UR STRIP.AUTO-MCNC: 1 MG/DL
UUN UR-MCNC: 353 MG/DL
VENT RATE: 24 /MIN
VENT RATE: 24 /MIN
VENTRICULAR RATE: 61 BPM
VIT B12 SERPL-MCNC: 609 PG/ML (ref 193–986)
WBC # BLD AUTO: 10 X10(3) UL (ref 4–11)
WBC # BLD AUTO: 11 X10(3) UL (ref 4–11)
WBC # BLD AUTO: 11.2 X10(3) UL (ref 4–11)
WBC # BLD AUTO: 12.7 X10(3) UL (ref 4–11)
WBC # BLD AUTO: 13.5 X10(3) UL (ref 4–11)
WBC # BLD AUTO: 15.9 X10(3) UL (ref 4–11)
WBC # BLD AUTO: 17.1 X10(3) UL (ref 4–11)
WBC # BLD AUTO: 5.9 X10(3) UL (ref 4–11)
WBC # BLD AUTO: 8.1 X10(3) UL (ref 4–11)
WBC # BLD AUTO: 8.3 X10(3) UL (ref 4–11)
WBC # BLD AUTO: 8.5 X10(3) UL (ref 4–11)
WBC # BLD AUTO: 8.9 X10(3) UL (ref 4–11)
WBC # BLD AUTO: 9.2 X10(3) UL (ref 4–11)
WBC # BLD AUTO: 9.2 X10(3) UL (ref 4–11)
WBC # BLD AUTO: 9.6 X10(3) UL (ref 4–11)
WBC #/AREA URNS AUTO: >50 /HPF
WBC #/AREA URNS AUTO: >50 /HPF

## 2022-01-01 PROCEDURE — 71045 X-RAY EXAM CHEST 1 VIEW: CPT | Performed by: HOSPITALIST

## 2022-01-01 PROCEDURE — 99233 SBSQ HOSP IP/OBS HIGH 50: CPT | Performed by: HOSPITALIST

## 2022-01-01 PROCEDURE — 99232 SBSQ HOSP IP/OBS MODERATE 35: CPT | Performed by: HOSPITALIST

## 2022-01-01 PROCEDURE — 99233 SBSQ HOSP IP/OBS HIGH 50: CPT | Performed by: INTERNAL MEDICINE

## 2022-01-01 PROCEDURE — 71045 X-RAY EXAM CHEST 1 VIEW: CPT | Performed by: INTERNAL MEDICINE

## 2022-01-01 PROCEDURE — 99291 CRITICAL CARE FIRST HOUR: CPT | Performed by: INTERNAL MEDICINE

## 2022-01-01 PROCEDURE — X1114 CARDIAC DEVICE HOME CHECK - REMOTE UNSCHEDULED: HCPCS | Performed by: INTERNAL MEDICINE

## 2022-01-01 PROCEDURE — 93306 TTE W/DOPPLER COMPLETE: CPT | Performed by: NURSE PRACTITIONER

## 2022-01-01 PROCEDURE — 30233H1 TRANSFUSION OF NONAUTOLOGOUS WHOLE BLOOD INTO PERIPHERAL VEIN, PERCUTANEOUS APPROACH: ICD-10-PCS | Performed by: HOSPITALIST

## 2022-01-01 PROCEDURE — 95816 EEG AWAKE AND DROWSY: CPT | Performed by: OTHER

## 2022-01-01 PROCEDURE — 99291 CRITICAL CARE FIRST HOUR: CPT | Performed by: HOSPITALIST

## 2022-01-01 PROCEDURE — 71045 X-RAY EXAM CHEST 1 VIEW: CPT | Performed by: EMERGENCY MEDICINE

## 2022-01-01 PROCEDURE — 99213 OFFICE O/P EST LOW 20 MIN: CPT | Performed by: NURSE PRACTITIONER

## 2022-01-01 PROCEDURE — 5A09457 ASSISTANCE WITH RESPIRATORY VENTILATION, 24-96 CONSECUTIVE HOURS, CONTINUOUS POSITIVE AIRWAY PRESSURE: ICD-10-PCS | Performed by: HOSPITALIST

## 2022-01-01 PROCEDURE — 99232 SBSQ HOSP IP/OBS MODERATE 35: CPT | Performed by: STUDENT IN AN ORGANIZED HEALTH CARE EDUCATION/TRAINING PROGRAM

## 2022-01-01 PROCEDURE — 71045 X-RAY EXAM CHEST 1 VIEW: CPT | Performed by: NURSE PRACTITIONER

## 2022-01-01 PROCEDURE — 99239 HOSP IP/OBS DSCHRG MGMT >30: CPT | Performed by: HOSPITALIST

## 2022-01-01 PROCEDURE — 76775 US EXAM ABDO BACK WALL LIM: CPT | Performed by: NURSE PRACTITIONER

## 2022-01-01 PROCEDURE — 93280 PM DEVICE PROGR EVAL DUAL: CPT | Performed by: INTERNAL MEDICINE

## 2022-01-01 PROCEDURE — 99292 CRITICAL CARE ADDL 30 MIN: CPT | Performed by: HOSPITALIST

## 2022-01-01 PROCEDURE — 99239 HOSP IP/OBS DSCHRG MGMT >30: CPT | Performed by: STUDENT IN AN ORGANIZED HEALTH CARE EDUCATION/TRAINING PROGRAM

## 2022-01-01 PROCEDURE — 99232 SBSQ HOSP IP/OBS MODERATE 35: CPT | Performed by: INTERNAL MEDICINE

## 2022-01-01 PROCEDURE — 99223 1ST HOSP IP/OBS HIGH 75: CPT | Performed by: INTERNAL MEDICINE

## 2022-01-01 PROCEDURE — 30233N1 TRANSFUSION OF NONAUTOLOGOUS RED BLOOD CELLS INTO PERIPHERAL VEIN, PERCUTANEOUS APPROACH: ICD-10-PCS | Performed by: HOSPITALIST

## 2022-01-01 PROCEDURE — 93970 EXTREMITY STUDY: CPT | Performed by: HOSPITALIST

## 2022-01-01 PROCEDURE — 99222 1ST HOSP IP/OBS MODERATE 55: CPT | Performed by: NURSE PRACTITIONER

## 2022-01-01 PROCEDURE — 99215 OFFICE O/P EST HI 40 MIN: CPT | Performed by: INTERNAL MEDICINE

## 2022-01-01 PROCEDURE — 70450 CT HEAD/BRAIN W/O DYE: CPT | Performed by: HOSPITALIST

## 2022-01-01 PROCEDURE — 74176 CT ABD & PELVIS W/O CONTRAST: CPT

## 2022-01-01 PROCEDURE — 99231 SBSQ HOSP IP/OBS SF/LOW 25: CPT | Performed by: NURSE PRACTITIONER

## 2022-01-01 PROCEDURE — 02HV33Z INSERTION OF INFUSION DEVICE INTO SUPERIOR VENA CAVA, PERCUTANEOUS APPROACH: ICD-10-PCS | Performed by: HOSPITALIST

## 2022-01-01 RX ORDER — ERGOCALCIFEROL 1.25 MG/1
CAPSULE ORAL
COMMUNITY

## 2022-01-01 RX ORDER — MEMANTINE HYDROCHLORIDE 10 MG/1
10 TABLET ORAL 2 TIMES DAILY
Status: DISCONTINUED | OUTPATIENT
Start: 2022-01-01 | End: 2022-01-01

## 2022-01-01 RX ORDER — ENOXAPARIN SODIUM 100 MG/ML
40 INJECTION SUBCUTANEOUS DAILY
Status: DISCONTINUED | OUTPATIENT
Start: 2022-01-01 | End: 2022-01-01

## 2022-01-01 RX ORDER — POTASSIUM CHLORIDE 1.5 G/1.77G
20 POWDER, FOR SOLUTION ORAL ONCE
Status: COMPLETED | OUTPATIENT
Start: 2022-01-01 | End: 2022-01-01

## 2022-01-01 RX ORDER — LEVOTHYROXINE SODIUM 0.05 MG/1
50 TABLET ORAL
Status: ON HOLD | COMMUNITY
Start: 2022-01-01 | End: 2022-01-01

## 2022-01-01 RX ORDER — SCOLOPAMINE TRANSDERMAL SYSTEM 1 MG/1
1 PATCH, EXTENDED RELEASE TRANSDERMAL
Status: DISCONTINUED | OUTPATIENT
Start: 2022-01-01 | End: 2022-01-01

## 2022-01-01 RX ORDER — ONDANSETRON 2 MG/ML
4 INJECTION INTRAMUSCULAR; INTRAVENOUS EVERY 6 HOURS PRN
Status: DISCONTINUED | OUTPATIENT
Start: 2022-01-01 | End: 2022-01-01

## 2022-01-01 RX ORDER — METOPROLOL TARTRATE 5 MG/5ML
5 INJECTION INTRAVENOUS EVERY 6 HOURS
Status: DISCONTINUED | OUTPATIENT
Start: 2022-01-01 | End: 2022-01-01

## 2022-01-01 RX ORDER — ATROPINE SULFATE 10 MG/ML
2 SOLUTION/ DROPS OPHTHALMIC EVERY 2 HOUR PRN
Status: DISCONTINUED | OUTPATIENT
Start: 2022-01-01 | End: 2022-01-01

## 2022-01-01 RX ORDER — MAGNESIUM HYDROXIDE 1200 MG/15ML
3000 LIQUID ORAL CONTINUOUS
Status: DISCONTINUED | OUTPATIENT
Start: 2022-01-01 | End: 2022-01-01

## 2022-01-01 RX ORDER — SODIUM CHLORIDE 9 MG/ML
INJECTION, SOLUTION INTRAVENOUS CONTINUOUS
Status: ACTIVE | OUTPATIENT
Start: 2022-01-01 | End: 2022-01-01

## 2022-01-01 RX ORDER — LORAZEPAM 0.5 MG/1
0.5 TABLET ORAL EVERY 4 HOURS PRN
Status: DISCONTINUED | OUTPATIENT
Start: 2022-01-01 | End: 2022-01-01

## 2022-01-01 RX ORDER — ALUMINUM ZIRCONIUM OCTACHLOROHYDREX GLY 16 G/100G
1 GEL TOPICAL
Status: ON HOLD | COMMUNITY
End: 2022-01-01

## 2022-01-01 RX ORDER — DEXTROSE MONOHYDRATE 50 MG/ML
INJECTION, SOLUTION INTRAVENOUS CONTINUOUS
Status: ACTIVE | OUTPATIENT
Start: 2022-01-01 | End: 2022-01-01

## 2022-01-01 RX ORDER — GLYCOPYRROLATE 0.2 MG/ML
0.4 INJECTION, SOLUTION INTRAMUSCULAR; INTRAVENOUS
Status: DISCONTINUED | OUTPATIENT
Start: 2022-01-01 | End: 2022-01-01

## 2022-01-01 RX ORDER — ACETAMINOPHEN 650 MG/1
650 SUPPOSITORY RECTAL EVERY 6 HOURS PRN
Status: DISCONTINUED | OUTPATIENT
Start: 2022-01-01 | End: 2022-01-01

## 2022-01-01 RX ORDER — LORAZEPAM 1 MG/1
1 TABLET ORAL EVERY 4 HOURS PRN
Status: DISCONTINUED | OUTPATIENT
Start: 2022-01-01 | End: 2022-01-01

## 2022-01-01 RX ORDER — HALOPERIDOL 5 MG/ML
2 INJECTION INTRAMUSCULAR
Status: DISCONTINUED | OUTPATIENT
Start: 2022-01-01 | End: 2022-01-01

## 2022-01-01 RX ORDER — FUROSEMIDE 40 MG/1
40 TABLET ORAL EVERY 8 HOURS PRN
Status: DISCONTINUED | OUTPATIENT
Start: 2022-01-01 | End: 2022-01-01

## 2022-01-01 RX ORDER — POTASSIUM CHLORIDE 1.5 G/1.77G
40 POWDER, FOR SOLUTION ORAL ONCE
Status: COMPLETED | OUTPATIENT
Start: 2022-01-01 | End: 2022-01-01

## 2022-01-01 RX ORDER — LIDOCAINE HYDROCHLORIDE 20 MG/ML
10 JELLY TOPICAL ONCE
Status: DISCONTINUED | OUTPATIENT
Start: 2022-01-01 | End: 2022-01-01

## 2022-01-01 RX ORDER — POTASSIUM CHLORIDE 20 MEQ/1
20 TABLET, EXTENDED RELEASE ORAL ONCE
Status: COMPLETED | OUTPATIENT
Start: 2022-01-01 | End: 2022-01-01

## 2022-01-01 RX ORDER — ATORVASTATIN CALCIUM 10 MG/1
10 TABLET, FILM COATED ORAL NIGHTLY
Status: DISCONTINUED | OUTPATIENT
Start: 2022-01-01 | End: 2022-01-01

## 2022-01-01 RX ORDER — MORPHINE SULFATE 20 MG/ML
5 SOLUTION ORAL EVERY 6 HOURS
Status: DISCONTINUED | OUTPATIENT
Start: 2022-01-01 | End: 2022-01-01

## 2022-01-01 RX ORDER — HALOPERIDOL 5 MG/ML
1 INJECTION INTRAMUSCULAR
Status: DISCONTINUED | OUTPATIENT
Start: 2022-01-01 | End: 2022-01-01

## 2022-01-01 RX ORDER — POTASSIUM CHLORIDE 20 MEQ/1
40 TABLET, EXTENDED RELEASE ORAL ONCE
Status: COMPLETED | OUTPATIENT
Start: 2022-01-01 | End: 2022-01-01

## 2022-01-01 RX ORDER — METOCLOPRAMIDE HYDROCHLORIDE 5 MG/ML
5 INJECTION INTRAMUSCULAR; INTRAVENOUS EVERY 8 HOURS PRN
Status: DISCONTINUED | OUTPATIENT
Start: 2022-01-01 | End: 2022-01-01

## 2022-01-01 RX ORDER — DEXMEDETOMIDINE HYDROCHLORIDE 4 UG/ML
INJECTION, SOLUTION INTRAVENOUS CONTINUOUS
Status: DISCONTINUED | OUTPATIENT
Start: 2022-01-01 | End: 2022-01-01 | Stop reason: ALTCHOICE

## 2022-01-01 RX ORDER — FUROSEMIDE 10 MG/ML
40 INJECTION INTRAMUSCULAR; INTRAVENOUS ONCE
Status: COMPLETED | OUTPATIENT
Start: 2022-01-01 | End: 2022-01-01

## 2022-01-01 RX ORDER — TORSEMIDE 20 MG/1
40 TABLET ORAL DAILY
Status: DISCONTINUED | OUTPATIENT
Start: 2022-01-01 | End: 2022-01-01

## 2022-01-01 RX ORDER — LORAZEPAM 1 MG/1
2 TABLET ORAL EVERY 4 HOURS PRN
Status: DISCONTINUED | OUTPATIENT
Start: 2022-01-01 | End: 2022-01-01

## 2022-01-01 RX ORDER — SODIUM CHLORIDE 450 MG/100ML
INJECTION, SOLUTION INTRAVENOUS CONTINUOUS
Status: DISCONTINUED | OUTPATIENT
Start: 2022-01-01 | End: 2022-01-01

## 2022-01-01 RX ORDER — ACETAMINOPHEN 500 MG
1000 TABLET ORAL EVERY 4 HOURS PRN
Status: DISCONTINUED | OUTPATIENT
Start: 2022-01-01 | End: 2022-01-01

## 2022-01-01 RX ORDER — ACETAMINOPHEN 325 MG/1
650 TABLET ORAL EVERY 6 HOURS PRN
COMMUNITY

## 2022-01-01 RX ORDER — SODIUM CHLORIDE 9 MG/ML
INJECTION, SOLUTION INTRAVENOUS ONCE
Status: COMPLETED | OUTPATIENT
Start: 2022-01-01 | End: 2022-01-01

## 2022-01-01 RX ORDER — MORPHINE SULFATE 20 MG/ML
5 SOLUTION ORAL
Status: DISCONTINUED | OUTPATIENT
Start: 2022-01-01 | End: 2022-01-01

## 2022-01-01 RX ORDER — ECHINACEA PURPUREA EXTRACT 125 MG
1 TABLET ORAL
Status: DISCONTINUED | OUTPATIENT
Start: 2022-01-01 | End: 2022-01-01

## 2022-01-01 RX ORDER — LISINOPRIL 10 MG/1
10 TABLET ORAL DAILY
COMMUNITY
Start: 2022-01-01 | End: 2022-01-01

## 2022-01-01 RX ORDER — MELATONIN
3 NIGHTLY PRN
Status: DISCONTINUED | OUTPATIENT
Start: 2022-01-01 | End: 2022-01-01

## 2022-01-01 RX ORDER — ACETAMINOPHEN 160 MG/5ML
650 SOLUTION ORAL EVERY 6 HOURS PRN
Status: DISCONTINUED | OUTPATIENT
Start: 2022-01-01 | End: 2022-01-01

## 2022-01-01 RX ORDER — MORPHINE SULFATE 2 MG/ML
1 INJECTION, SOLUTION INTRAMUSCULAR; INTRAVENOUS
Status: DISCONTINUED | OUTPATIENT
Start: 2022-01-01 | End: 2022-01-01

## 2022-01-01 RX ORDER — METOPROLOL SUCCINATE 25 MG/1
25 TABLET, EXTENDED RELEASE ORAL
Status: DISCONTINUED | OUTPATIENT
Start: 2022-01-01 | End: 2022-01-01

## 2022-01-01 RX ORDER — MELATONIN
1000 DAILY
Status: ON HOLD | COMMUNITY
End: 2022-01-01

## 2022-01-01 RX ORDER — FUROSEMIDE 10 MG/ML
20 INJECTION INTRAMUSCULAR; INTRAVENOUS ONCE
Status: COMPLETED | OUTPATIENT
Start: 2022-01-01 | End: 2022-01-01

## 2022-01-01 RX ORDER — ERGOCALCIFEROL 1.25 MG/1
50000 CAPSULE ORAL
Status: ON HOLD | COMMUNITY
End: 2022-01-01

## 2022-01-01 RX ORDER — FUROSEMIDE 10 MG/ML
40 INJECTION INTRAMUSCULAR; INTRAVENOUS EVERY 8 HOURS PRN
Status: DISCONTINUED | OUTPATIENT
Start: 2022-01-01 | End: 2022-01-01

## 2022-01-01 RX ORDER — LEVOTHYROXINE SODIUM 0.03 MG/1
25 TABLET ORAL
Status: DISCONTINUED | OUTPATIENT
Start: 2022-01-01 | End: 2022-01-01

## 2022-01-01 RX ORDER — MAGNESIUM HYDROXIDE/ALUMINUM HYDROXICE/SIMETHICONE 120; 1200; 1200 MG/30ML; MG/30ML; MG/30ML
30 SUSPENSION ORAL 4 TIMES DAILY PRN
Status: ON HOLD | COMMUNITY
End: 2022-01-01

## 2022-01-01 RX ORDER — FUROSEMIDE 10 MG/ML
40 INJECTION INTRAMUSCULAR; INTRAVENOUS DAILY
Status: DISCONTINUED | OUTPATIENT
Start: 2022-01-01 | End: 2022-01-01

## 2022-01-01 RX ORDER — DONEPEZIL HYDROCHLORIDE 10 MG/1
10 TABLET, FILM COATED ORAL NIGHTLY
Status: DISCONTINUED | OUTPATIENT
Start: 2022-01-01 | End: 2022-01-01

## 2022-01-01 RX ORDER — BISACODYL 10 MG
10 SUPPOSITORY, RECTAL RECTAL
Status: DISCONTINUED | OUTPATIENT
Start: 2022-01-01 | End: 2022-01-01

## 2022-01-01 RX ORDER — LIDOCAINE HYDROCHLORIDE 20 MG/ML
10 JELLY TOPICAL AS NEEDED
Status: DISCONTINUED | OUTPATIENT
Start: 2022-01-01 | End: 2022-01-01

## 2022-01-01 RX ORDER — FINASTERIDE 5 MG/1
5 TABLET, FILM COATED ORAL DAILY
Status: DISCONTINUED | OUTPATIENT
Start: 2022-01-01 | End: 2022-01-01 | Stop reason: SDUPTHER

## 2022-01-01 RX ORDER — ONDANSETRON 4 MG/1
4 TABLET, ORALLY DISINTEGRATING ORAL EVERY 6 HOURS PRN
Status: DISCONTINUED | OUTPATIENT
Start: 2022-01-01 | End: 2022-01-01

## 2022-10-01 ENCOUNTER — HOSPITAL ENCOUNTER (INPATIENT)
Facility: HOSPITAL | Age: 87
LOS: 13 days | Discharge: HOSPICE | End: 2022-10-15
Attending: EMERGENCY MEDICINE | Admitting: HOSPITALIST
Payer: MEDICARE

## 2022-10-02 ENCOUNTER — APPOINTMENT (OUTPATIENT)
Dept: GENERAL RADIOLOGY | Facility: HOSPITAL | Age: 87
End: 2022-10-02
Attending: EMERGENCY MEDICINE
Payer: MEDICARE

## 2022-10-02 PROBLEM — R06.89 CO2 NARCOSIS: Status: ACTIVE | Noted: 2022-01-01

## 2022-10-02 PROBLEM — E87.29 RESPIRATORY ACIDOSIS: Status: ACTIVE | Noted: 2022-10-02

## 2022-10-02 PROBLEM — J81.0 ACUTE PULMONARY EDEMA (HCC): Status: ACTIVE | Noted: 2022-01-01

## 2022-10-02 PROBLEM — R31.0 GROSS HEMATURIA: Status: ACTIVE | Noted: 2022-01-01

## 2022-10-02 PROBLEM — R31.0 GROSS HEMATURIA: Status: ACTIVE | Noted: 2022-10-02

## 2022-10-02 PROBLEM — E87.1 HYPONATREMIA: Status: ACTIVE | Noted: 2022-10-02

## 2022-10-02 PROBLEM — E87.1 HYPONATREMIA: Status: ACTIVE | Noted: 2022-01-01

## 2022-10-02 PROBLEM — R06.89 CO2 NARCOSIS: Status: ACTIVE | Noted: 2022-10-02

## 2022-10-02 PROBLEM — E87.3 METABOLIC ALKALOSIS: Status: ACTIVE | Noted: 2022-10-02

## 2022-10-02 PROBLEM — E87.3 METABOLIC ALKALOSIS: Status: ACTIVE | Noted: 2022-01-01

## 2022-10-02 PROBLEM — E87.29 RESPIRATORY ACIDOSIS: Status: ACTIVE | Noted: 2022-01-01

## 2022-10-02 PROBLEM — J81.0 ACUTE PULMONARY EDEMA (HCC): Status: ACTIVE | Noted: 2022-10-02

## 2022-10-02 NOTE — PROGRESS NOTES
10/02/22 1524   BiPAP   Device V60   BiPAP / CPAP CE# v211   BiPAP bacteria filter Yes   BIPAP plugged into main power?  Yes   Mode Spontaneous/Timed   Interface Full face mask   Mask Size Medium   Control Settings   Set Rate 20 breaths/min   Set IPAP 14   Set EPAP 5   Oxygen Percent 40 %   Inspiratory time 0.9   Insp rise time 3   BiPAP/CPAP Alarm Settings   Hi Rate 45   Low Rate 10   Hi VT 1200   Low    Hi Pressure 25   Low Pressure 5   Low Pressure Delay 20   Low MV 2   BiPAP/CPAP Monitored Parameters   Pulse 60   PIP 14   Total Rate 20 breaths/min   Minute Volume 11   Tidal Volume 567   Total Leak 14   Trigger % 57   Ti/Ttot % 30   IPAP 14   EPAP 5   Toleration Well

## 2022-10-02 NOTE — PLAN OF CARE
NURSING ADMISSION NOTE      Patient admitted via Stretcher to room,   Oriented to room. Safety precautions initiated. Bed in low position. Call light in reach. Accompanied by respiratory and ER nurse, Silva Catheter in place, Gross hematuria noted with clots in tubing , nurse reports diminished output in ER. 3 way silva , irrigated x 1. Small return. Bi-pap in use. intermittentingly opening eyes. Appears drowsy, Hospitalist paged to notify of above.

## 2022-10-02 NOTE — PLAN OF CARE
Problem: Patient/Family Goals  Goal: Patient/Family Long Term Goal  Description: Patient's Long Term Goal: To return to nursing home    Interventions:  - Telemetry monitoring     Bipap     Pulmonary consult     Urinary catheter  - See additional Care Plan goals for specific interventions  Outcome: Progressing  Goal: Patient/Family Short Term Goal  Description: Patient's Short Term Goal:  to get better    Interventions:   -continue to monitor  - See additional Care Plan goals for specific interventions  Outcome: Progressing     Problem: PAIN - ADULT  Goal: Verbalizes/displays adequate comfort level or patient's stated pain goal  Description: INTERVENTIONS:  - Encourage pt to monitor pain and request assistance  - Assess pain using appropriate pain scale  - Administer analgesics based on type and severity of pain and evaluate response  - Implement non-pharmacological measures as appropriate and evaluate response  - Consider cultural and social influences on pain and pain management  - Manage/alleviate anxiety  - Utilize distraction and/or relaxation techniques  - Monitor for opioid side effects  - Notify MD/LIP if interventions unsuccessful or patient reports new pain  - Anticipate increased pain with activity and pre-medicate as appropriate  Outcome: Progressing     Problem: RISK FOR INFECTION - ADULT  Goal: Absence of fever/infection during anticipated neutropenic period  Description: INTERVENTIONS  - Monitor WBC  - Administer growth factors as ordered  - Implement neutropenic guidelines  Outcome: Progressing     Problem: SAFETY ADULT - FALL  Goal: Free from fall injury  Description: INTERVENTIONS:  - Assess pt frequently for physical needs  - Identify cognitive and physical deficits and behaviors that affect risk of falls.   - [place] fall precautions as indicated by assessment.  - Educate pt/family on patient safety including physical limitations  - Instruct pt to call for assistance with activity based on assessment  - Modify environment to reduce risk of injury  - Provide assistive devices as appropriate    Fall precautions in progress  - Consider OT/PT consult to assist with strengthening/mobility  - Encourage toileting schedule  Outcome: Progressing     Problem: DISCHARGE PLANNING  Goal: Discharge to home or other facility with appropriate resources  Description: INTERVENTIONS:  - Identify barriers to discharge w/pt and caregiver  - Include patient/family/discharge partner in discharge planning  - Arrange for needed discharge resources and transportation as appropriate  - Identify discharge learning needs (meds, wound care, etc)  - Arrange for interpreters to assist at discharge as needed  - Consider post-discharge preferences of patient/family/discharge partner  - Complete POLST form as appropriate  - Assess patient's ability to be responsible for managing their own health  - Refer to Case Management Department for coordinating discharge planning if the patient needs post-hospital services based on physician/LIP order or complex needs related to functional status, cognitive ability or social support system  Outcome: Progressing     Problem: PAIN - ADULT  Goal: Verbalizes/displays adequate comfort level or patient's stated pain goal  Description: INTERVENTIONS:  - Encourage pt to monitor pain and request assistance  - Assess pain using appropriate pain scale  - Administer analgesics based on type and severity of pain and evaluate response  - Implement non-pharmacological measures as appropriate and evaluate response  - Consider cultural and social influences on pain and pain management  - Manage/alleviate anxiety  - Utilize distraction and/or relaxation techniques  - Monitor for opioid side effects  - Notify MD/LIP if interventions unsuccessful or patient reports new pain  - Anticipate increased pain with activity and pre-medicate as appropriate  Outcome: Progressing

## 2022-10-02 NOTE — ED QUICK NOTES
Pt O2 sat dropped to 90% with a good wave form. O2 2L NC applied O2 increased to 92%. O2 increased to 4 L NC and sat went to 98%.  O2 left on 4L NC.

## 2022-10-02 NOTE — PROGRESS NOTES
[facility] Pharmacy Note:  Renal Dose Adjustment for Metoclopramide (REGLAN)    Franky Dolan has been prescribed Metoclopramide (REGLAN) 10 mg every 8 hours as needed for nausea/vomiting,. Estimated Creatinine Clearance: 35 mL/min (A) (based on SCr of 1.68 mg/dL (H)). Calculated creatinine clearance is < 40 ml/min, therefore, the dose of Metoclopramide (REGLAN) has been changed to 5 mg every 8 hours as needed for nausea/vomiting per P&T approved protocol. Pharmacy will continue to follow, and if renal function improves, will resume the original order.        Thank you,  Mariah Clinton, PharmD  10/2/2022 10:26 AM

## 2022-10-02 NOTE — ED QUICK NOTES
Orders for admission, patient is aware of plan and ready to go upstairs. Any questions, please call ED RN Michell Le at extension [phone]. Patient Covid vaccination status: Unvaccinated     COVID Test Ordered in ED: Rapid SARS-CoV-2 by PCR neg    COVID Suspicion at Admission: N/A    Running Infusions:  None    Mental Status/LOC at time of transport: lethargic not speaking. Will open eyes to pain then back to sleep. Usually oriented to person. Straight cath returned what looked like thick pure blood so 3 way catheter inserted-no CBI done and 3rd port is capped. Pt from [facility] to ER with C/O SOB. DNR on chart. HX CHF, Pace maker, CVA, renal disease. Pt in [address] upon arrival now in [place].     Other pertinent information:   CIWA score: N/A   NIH score:  N/A

## 2022-10-02 NOTE — SLP NOTE
Order received, chart reviewed. Spoke with RN who reported patient on BiPAP and not appropriate for evaluation at this time. Will hold and check status 10/3/22, completing evaluation as clinically appropriate.     Denae Puente 87 CCC-SLP  Pager [phone]

## 2022-10-02 NOTE — PROGRESS NOTES
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 Dementia suspect vascular- pt arousable and follows simple commands then goes back to sleep  Repeat ABG reviewed - pulmonary bedside   High aspiration risk - speech eval   Afib and pacemaker  Torsemide recently stopped and gave D5 give- restart IV diuresis as overloaded on exam and CXR  CKD - monitor       ADDENDUM:  Paged by nurse for blood pressure drifting down  Still with hematuria- Urology consulted  CT abd/pelvis ordered  Add on CT brain with abd/pelvis  Lactic acid WNL and ABG improving  Start IVF with small bolus and continue but cautious with overload on admission with elevated BNP but BP dropping  T/f to ICU for closer monitoring today   Cont Zosyn   Blood Cx pending   Total CCT spent today with patient, POA, staff 37 minutes     Scar Brooks MD

## 2022-10-03 ENCOUNTER — APPOINTMENT (OUTPATIENT)
Dept: CV DIAGNOSTICS | Facility: HOSPITAL | Age: 87
End: 2022-10-03
Attending: NURSE PRACTITIONER
Payer: MEDICARE

## 2022-10-03 ENCOUNTER — APPOINTMENT (OUTPATIENT)
Dept: GENERAL RADIOLOGY | Facility: HOSPITAL | Age: 87
End: 2022-10-03
Attending: NURSE PRACTITIONER
Payer: MEDICARE

## 2022-10-03 ENCOUNTER — APPOINTMENT (OUTPATIENT)
Dept: CT IMAGING | Facility: HOSPITAL | Age: 87
End: 2022-10-03
Payer: MEDICARE

## 2022-10-03 ENCOUNTER — APPOINTMENT (OUTPATIENT)
Dept: GENERAL RADIOLOGY | Facility: HOSPITAL | Age: 87
End: 2022-10-03
Attending: HOSPITALIST
Payer: MEDICARE

## 2022-10-03 NOTE — CM/SW NOTE
Patient is From [place] [id number],  sent clinical updates to facility via [facility] system. SW/CM to remain available for any further discharge planning needs.    Anne Galaviz RN Case Manager [id number]

## 2022-10-03 NOTE — PLAN OF CARE
At approx 1610 received pt from [facility] with low bp. CBI wide open to 3 way cath. Dr Georgia Santana informed of pt's transfer and status and pt started on levophed for sbp 90 or map 61. Pt only responding to pain but becoming more agitated by 1800. Nephew who is POA here and updated on pt's status. 3 way cath clotted and Dr. Dusitn Albright informed, orders received and new 3 way inserted per orders. For CT later tonight. Report to noc KORTNEY.

## 2022-10-03 NOTE — PROGRESS NOTES
[facility] Pharmacy Note:  Dose Adjustment for piperacillin/tazobactam (Lauren Fruit)    Stephanie Graham is a 80year old patient who has been prescribed piperacillin/tazobactam (ZOSYN) 4.5 g every 8 hrs. The estimated creatinine clearance is 29.3 mL/min (A) (based on SCr of 2.01 mg/dL (H)). The dose has been adjusted to piperacillin/tazobactam (ZOSYN) 3.375 g every 8 hrs per hospital renal dose adjustment protocol for treatment of pneumonia. Pharmacy will follow and adjust dose as warranted for additional renal function changes.     Thank you,  Sunil Byrne, PharmD  10/3/2022  10:57 AM

## 2022-10-03 NOTE — PROGRESS NOTES
PICC line placed, noted by radiology to be in the azygos vein. PICC withdrawn 3cm, blood return noted, CXR pending.     POORNIMA GudinoP  Critical Care NP  Phone [phone], pager [phone]

## 2022-10-03 NOTE — SLP NOTE
SLP visit attempted to evaluate oropharyngeal swallow. Patient continues to require BiPAP support and not appropriate for evaluation at this time. Will continue to monitor and re-attempt as medically appropriate. Discussed with RN.

## 2022-10-04 ENCOUNTER — APPOINTMENT (OUTPATIENT)
Dept: ULTRASOUND IMAGING | Facility: HOSPITAL | Age: 87
End: 2022-10-04
Attending: NURSE PRACTITIONER
Payer: MEDICARE

## 2022-10-04 ENCOUNTER — NURSE ONLY (OUTPATIENT)
Dept: ELECTROPHYSIOLOGY | Facility: HOSPITAL | Age: 87
End: 2022-10-04
Attending: HOSPITALIST
Payer: MEDICARE

## 2022-10-04 PROBLEM — G93.40 ENCEPHALOPATHY: Status: ACTIVE | Noted: 2019-01-01

## 2022-10-04 NOTE — PROCEDURES
ELECTROENCEPHALOGRAM REPORT      Patient Name: Tolu Briggs   : 1935   Requesting Physician: Dr. Shana Stein   Date of Test: 10/4/2022   History: 80year old male with history of seizure, with PNA and acute respiratory failure, BETY, with increased lethargy today. TECHNICAL ASPECTS OF EEG RECORDING:  This is a scalp EEG monitoring study. Scalp electrodes were positioned according to the 10-20 International system of electrode placement. EEG data were recorded continuously and digitally stored, and this is a routine study. No sedation was given. EEG data were reviewed using bipolar and referential montages. DESCRIPTION OF EEG FINDINGS:  BACKGROUND ACTIVITY: The background rhythm is not well appreciated, and instead seen is intermittent diffuse slowing in the 4Hz range, superimposed on intermittent fast frequencies. Also, intermixed are 4 to 5 per second waves on the left frontal area. INTERICTAL EPILEPTIFORM ACTIVITY: None  ICTAL ACTIVITY: None  ACTIVATION PROCEDURES: Not performed  SLEEP: Sleep pattern was not clearly seen    IMPRESSION:  This is an abnormal EEG. This study is consistent with moderate diffuse cerebral dysfunction of a non-specific type. In addition, there is left frontal slow wave activity, which is consistent with dysfunction in that region. Correlate with structural etiology, such as prior stroke. No epileptiform activity was seen.        [place] DO Mary  Neuromuscular and General Neurology  [facility]

## 2022-10-04 NOTE — PLAN OF CARE
Received patient following RN report with patient resting in bed on bipap, saturations WNL. DHT with TF, tolerating well. Weaned levophed to off. VSS. Call light within reach. Will continue to monitor.

## 2022-10-05 NOTE — CM/SW NOTE
CM contacted pt's guardian, Ale Mccollum, via phone to discuss POLST. The POLST sent from [facility] states that pt is a DNAR/Full. This was completed in 2015 by pt's previous POA. Pt's guardian states that pt's wishes have changed to DNAR/Select (as ordered in hospital) since then and would like the POLST to be updated to reflect this. Pt's guardian states that he will be coming to the hospital this evening and would be happy to complete/sign the document at that time. CM will follow up with guardian when arrives to complete documentation. 1700: CM met with pt's guardian, Ale Mccollum, to complete POLST. POLST signed by guardian and CM as witness. Left on hard chart for physician signature. Dr. Stephenson Link messaged to provide signature. RN updated.     SLADE LovellN, RN-BC    [id number]

## 2022-10-05 NOTE — PLAN OF CARE
Assumed care of patient this am, AMS, waxing/waning mental status. Patient continued to have equal strength bilaterally. No s/s of neglect. EEG (-), Transitioned off of AVAPS. Renal consult placed. US kidneys. TF. IVF infusing. Mental status improving throughout shift, APN/MD Aware.

## 2022-10-05 NOTE — PROGRESS NOTES
Received pt from night RN. Alert, unable to assess orientation. Follows commands, responds with inaudible speech. Switched to 4L NC, tolerating well. NG in place, tolerating TF. 2 bowel movements noted. Lyons discontinued, will continue to monitor urine output. See mar and flowsheets for further data.

## 2022-10-05 NOTE — PLAN OF CARE
Assumed care following RN report with patient resting in bed on 4L NC, saturations WNL. Per order, pt placed on bipap while sleeping. VSS. Call light within reach. Will continue to monitor.

## 2022-10-05 NOTE — SLP NOTE
Patient continues to require BiPAP support and not appropriate for SLP evaluation. Will continue to monitor and evaluate when medically appropriate.

## 2022-10-06 ENCOUNTER — APPOINTMENT (OUTPATIENT)
Dept: GENERAL RADIOLOGY | Facility: HOSPITAL | Age: 87
End: 2022-10-06
Attending: INTERNAL MEDICINE
Payer: MEDICARE

## 2022-10-06 NOTE — PROGRESS NOTES
Progress Note     Shaneka Hayden Patient Status:  Inpatient    1935 MRN [id number]   [facility] 4SW-A Attending [facility] Day # 4 PCP Delia Okeefe MD     Called and spoke w/ Dr Lazara Link - he asked for b12 and TSj levels.  Feels he needs more interaction , physical activity, before trying to feed him   Told he maybe moved out of ICU  Josi Callahan, CAESAR

## 2022-10-06 NOTE — CM/SW NOTE
CM reviewed pt's POLST to ensure validation. Provider signature received. Copy made of POLST and placed on hard chart to be sent with pt at time of discharge. POLST to be submitted to medical records to be added to EMR at time of discharge.     SLADE HardenN, RN-BC    [id number]

## 2022-10-06 NOTE — PLAN OF CARE
Assumed care of patient at shift change. Patient drowsy, confused, follows commands. No complaints of pain. BiPAP overnight. NSR. Blood pressure stable. Dobhoff with tube feeds running at goal rate. External catheter in place. BM x1.

## 2022-10-06 NOTE — PLAN OF CARE
Assumed care of patient this am, worked with PT/OT. DHT reinserted/CXR for proper placement. MD aware. Swallow eval failed. X1 BM. Tolerating TFs. Family updated via phone.

## 2022-10-07 PROBLEM — Z51.5 PALLIATIVE CARE ENCOUNTER: Status: ACTIVE | Noted: 2022-01-01

## 2022-10-07 PROBLEM — Z51.5 PALLIATIVE CARE ENCOUNTER: Status: ACTIVE | Noted: 2022-10-07

## 2022-10-07 PROBLEM — Z71.89 GOALS OF CARE, COUNSELING/DISCUSSION: Status: ACTIVE | Noted: 2022-01-01

## 2022-10-07 PROBLEM — Z71.89 GOALS OF CARE, COUNSELING/DISCUSSION: Status: ACTIVE | Noted: 2022-10-07

## 2022-10-07 NOTE — SLP NOTE
SPEECH DAILY NOTE - INPATIENT    ASSESSMENT & PLAN   ASSESSMENT  Patient seen for ongoing dysphagia diagnostic intervention. Patient remains in ICU and RN reported patient with no clinical progress in regards to function. Patient received on room air, [place] [address] in place. Patient responsive with single word utterances and grunting. Noted excessive drooling 2.2 decreased secretion management. Vocal quality clear. Patient in lethargic state. Oral care/hygiene initiated and patient noted with \"gurgly\" cough intermittently followed by suspected delay in pharyngeal swallow response. SpO2 decreased mid 80's however spontaneously recovered with time. Patient with decreased participation and deemed not a good candidate for PO trials at this time. Recommend continue NPO and use enteral means for nutrition/hydration/medication. Recommend oral care/hygiene with use of suction as needed. SLP to follow-up with ongoing re-assessment as patient able to participate.      Diet Recommendations - Solids: NPO  Diet Recommendations - Liquids: NPO  Medication Administration Recommendations: Non-oral     Discharge Recommendations: undetermined       Treatment Plan  Treatment Plan/Recommendations: SLP to reassess    Interdisciplinary Communication: Discussed with RN            GOALS  Goal #1 SLP to re-evaluate  Patient not progressing           FOLLOW UP  Follow Up Needed (Documentation Required): Yes, in 2-3 days or as patient demonstrates functional progress    Session: 1    If you have any questions, please contact PING Mccain Luite Kwame 87 CCC-SLP/L, pager [phone]  Speech-LanguagePathologist

## 2022-10-07 NOTE — PLAN OF CARE
Assumed care of patient around 0000. Patient drowsy, following commands. No complaints of pain. 5L NC, oxygen saturations stable. V paced. Dobhoff with tube feeds infusing at goal rate, tolerating well. Purewick in place. BM x1. Hemoglobin on AM labs 6.8. Hospitalist notified. 1uPRBCs ordered. Consent obtained from legal guardian, Sae Maldonado - placed in chart. Type and screen sent.

## 2022-10-07 NOTE — PROGRESS NOTES
Pt remains sleepy this AM. Arouses to speech. Able to nod yes/no and follow simple commands at times. Maintaining 02 sats on RA. Suctioned prn. Drooling at times. VSS. Tolerating tube feedings. Bladder scanned prn. Straight cath x1. No pain. Cares rendered. Report called to KORTNEY Wood. All questions answered.

## 2022-10-07 NOTE — PLAN OF CARE
Problem: Patient/Family Goals  Goal: Patient/Family Long Term Goal  Description: Patient's Long Term Goal: To return to nursing home    Interventions:  - Telemetry monitoring     Bipap     Pulmonary consult     Urinary catheter  - See additional Care Plan goals for specific interventions  Outcome: Progressing  Goal: Patient/Family Short Term Goal  Description: Patient's Short Term Goal:  to get off BiPap    Interventions:   -Monitor ABGs and mental status, BiPap  - See additional Care Plan goals for specific interventions  Outcome: Progressing     Problem: PAIN - ADULT  Goal: Verbalizes/displays adequate comfort level or patient's stated pain goal  Description: INTERVENTIONS:  - Encourage pt to monitor pain and request assistance  - Assess pain using appropriate pain scale  - Administer analgesics based on type and severity of pain and evaluate response  - Implement non-pharmacological measures as appropriate and evaluate response  - Consider cultural and social influences on pain and pain management  - Manage/alleviate anxiety  - Utilize distraction and/or relaxation techniques  - Monitor for opioid side effects  - Notify MD/LIP if interventions unsuccessful or patient reports new pain  - Anticipate increased pain with activity and pre-medicate as appropriate  Outcome: Progressing     Problem: RISK FOR INFECTION - ADULT  Goal: Absence of fever/infection during anticipated neutropenic period  Description: INTERVENTIONS  - Monitor WBC  - Administer growth factors as ordered  - Implement neutropenic guidelines  Outcome: Progressing     Problem: SAFETY ADULT - FALL  Goal: Free from fall injury  Description: INTERVENTIONS:  - Assess pt frequently for physical needs  - Identify cognitive and physical deficits and behaviors that affect risk of falls.   - [place] fall precautions as indicated by assessment.  - Educate pt/family on patient safety including physical limitations  - Instruct pt to call for assistance with activity based on assessment  - Modify environment to reduce risk of injury  - Provide assistive devices as appropriate    Fall precautions in progress  - Consider OT/PT consult to assist with strengthening/mobility  - Encourage toileting schedule  Outcome: Progressing     Problem: DISCHARGE PLANNING  Goal: Discharge to home or other facility with appropriate resources  Description: INTERVENTIONS:  - Identify barriers to discharge w/pt and caregiver  - Include patient/family/discharge partner in discharge planning  - Arrange for needed discharge resources and transportation as appropriate  - Identify discharge learning needs (meds, wound care, etc)  - Arrange for interpreters to assist at discharge as needed  - Consider post-discharge preferences of patient/family/discharge partner  - Complete POLST form as appropriate  - Assess patient's ability to be responsible for managing their own health  - Refer to Case Management Department for coordinating discharge planning if the patient needs post-hospital services based on physician/LIP order or complex needs related to functional status, cognitive ability or social support system  Outcome: Progressing     Problem: RESPIRATORY - ADULT  Goal: Achieves optimal ventilation and oxygenation  Description: INTERVENTIONS:  - Assess for changes in respiratory status  - Assess for changes in mentation and behavior  - Position to facilitate oxygenation and minimize respiratory effort  - Oxygen supplementation based on oxygen saturation or ABGs  - Provide Smoking Cessation handout, if applicable  - Encourage broncho-pulmonary hygiene including cough, deep breathe, Incentive Spirometry  - Assess the need for suctioning and perform as needed  - Assess and instruct to report SOB or any respiratory difficulty  - Respiratory Therapy support as indicated  - Manage/alleviate anxiety  - Monitor for signs/symptoms of CO2 retention  Outcome: Progressing     Problem: SKIN/TISSUE INTEGRITY - ADULT  Goal: Skin integrity remains intact  Description: INTERVENTIONS  - Assess and document risk factors for pressure ulcer development  - Assess and document skin integrity  - Monitor for areas of redness and/or skin breakdown  - Initiate interventions, skin care algorithm/standards of care as needed  Outcome: Progressing     Problem: NEUROLOGICAL - ADULT  Goal: Achieves stable or improved neurological status  Description: INTERVENTIONS  - Assess for and report changes in neurological status  - Initiate measures to prevent increased intracranial pressure  - Maintain blood pressure and fluid volume within ordered parameters to optimize cerebral perfusion and minimize risk of hemorrhage  - Monitor temperature, glucose, and sodium.  Initiate appropriate interventions as ordered  Outcome: Progressing  Goal: Absence of seizures  Description: INTERVENTIONS  - Monitor for seizure activity  - Administer anti-seizure medications as ordered  - Monitor neurological status  Outcome: Progressing     Problem: Delirium  Goal: Minimize duration of delirium  Description: Interventions:  - Encourage use of hearing aids, eye glasses  - Promote highest level of mobility daily  - Provide frequent reorientation  - Promote wakefulness i.e. lights on, blinds open  - Promote sleep, encourage patient's normal rest cycle i.e. lights off, TV off, minimize noise and interruptions  - Encourage family to assist in orientation and promotion of home routines  Outcome: Progressing

## 2022-10-08 ENCOUNTER — APPOINTMENT (OUTPATIENT)
Dept: GENERAL RADIOLOGY | Facility: HOSPITAL | Age: 87
End: 2022-10-08
Attending: INTERNAL MEDICINE
Payer: MEDICARE

## 2022-10-08 LAB — BLOOD TYPE BARCODE: 6200

## 2022-10-08 NOTE — PLAN OF CARE
80year old male , unable to assess a/o status. Patient on 65mL/hr of Jevity 1.5 on dobhoff. NPO due to aspiration precautions. Patient Bleeding on scrotum, skin protectant and gauze currently covering. Patient PRN straight cath because of urinary retention. Zosyn administered IV per orders. Continue plan of care.    Problem: Patient/Family Goals  Goal: Patient/Family Long Term Goal  Description: Patient's Long Term Goal: To return to nursing home    Interventions:  - Telemetry monitoring     Bipap     Pulmonary consult     Urinary catheter  - See additional Care Plan goals for specific interventions  Outcome: Progressing  Goal: Patient/Family Short Term Goal  Description: Patient's Short Term Goal:  to get off BiPap    Interventions:   -Monitor ABGs and mental status, BiPap  - See additional Care Plan goals for specific interventions  Outcome: Progressing

## 2022-10-08 NOTE — PLAN OF CARE
Assumed Pt care @4125. A/Oxself. When awake, nods appropriately. Nephew (POA/guardian) states Pt is about at baseline. Unable to speak but tried to mouth words. RA-. Rhonchi present/ diminished lung sounds. V-paced on tele. HR in 60s. Asp prec- NPO. Dobhoff R nare, tube feeds @65cc/hr w/ Q4 250 H20 flushes. Retains urine. PRN bladder scan + straight cath. Briefed. Bedrest, Q2 asst turns. Minimal movement of upper body. Buttock pressure injury. Zosyn Q8 IV. Q6 accucheck. R hand IV and R arm picc SL. All needs met at this time. Bed in lowest position. Call light within reach. Safety measures in place. Pt and nephew Dr Ramírez Villar updated on POC.   ------------------------  [id number]    Pt pulled out [facility]. Replaced in R stna @58cm. CXR pend. Order received for mitt restraints. Problem: SAFETY ADULT - FALL  Goal: Free from fall injury  Description: INTERVENTIONS:  - Assess pt frequently for physical needs  - Identify cognitive and physical deficits and behaviors that affect risk of falls.   - [place] fall precautions as indicated by assessment.  - Educate pt/family on patient safety including physical limitations  - Instruct pt to call for assistance with activity based on assessment  - Modify environment to reduce risk of injury  - Provide assistive devices as appropriate    Fall precautions in progress  - Consider OT/PT consult to assist with strengthening/mobility  - Encourage toileting schedule  Outcome: Progressing     Problem: DISCHARGE PLANNING  Goal: Discharge to home or other facility with appropriate resources  Description: INTERVENTIONS:  - Identify barriers to discharge w/pt and caregiver  - Include patient/family/discharge partner in discharge planning  - Arrange for needed discharge resources and transportation as appropriate  - Identify discharge learning needs (meds, wound care, etc)  - Arrange for interpreters to assist at discharge as needed  - Consider post-discharge preferences of patient/family/discharge partner  - Complete POLST form as appropriate  - Assess patient's ability to be responsible for managing their own health  - Refer to Case Management Department for coordinating discharge planning if the patient needs post-hospital services based on physician/LIP order or complex needs related to functional status, cognitive ability or social support system  Outcome: Progressing     Problem: RESPIRATORY - ADULT  Goal: Achieves optimal ventilation and oxygenation  Description: INTERVENTIONS:  - Assess for changes in respiratory status  - Assess for changes in mentation and behavior  - Position to facilitate oxygenation and minimize respiratory effort  - Oxygen supplementation based on oxygen saturation or ABGs  - Provide Smoking Cessation handout, if applicable  - Encourage broncho-pulmonary hygiene including cough, deep breathe, Incentive Spirometry  - Assess the need for suctioning and perform as needed  - Assess and instruct to report SOB or any respiratory difficulty  - Respiratory Therapy support as indicated  - Manage/alleviate anxiety  - Monitor for signs/symptoms of CO2 retention  Outcome: Progressing     Problem: SKIN/TISSUE INTEGRITY - ADULT  Goal: Skin integrity remains intact  Description: INTERVENTIONS  - Assess and document risk factors for pressure ulcer development  - Assess and document skin integrity  - Monitor for areas of redness and/or skin breakdown  - Initiate interventions, skin care algorithm/standards of care as needed  Outcome: Progressing     Problem: NEUROLOGICAL - ADULT  Goal: Achieves stable or improved neurological status  Description: INTERVENTIONS  - Assess for and report changes in neurological status  - Initiate measures to prevent increased intracranial pressure  - Maintain blood pressure and fluid volume within ordered parameters to optimize cerebral perfusion and minimize risk of hemorrhage  - Monitor temperature, glucose, and sodium.  Initiate appropriate interventions as ordered  Outcome: Progressing  Goal: Absence of seizures  Description: INTERVENTIONS  - Monitor for seizure activity  - Administer anti-seizure medications as ordered  - Monitor neurological status  Outcome: Progressing     Problem: Delirium  Goal: Minimize duration of delirium  Description: Interventions:  - Encourage use of hearing aids, eye glasses  - Promote highest level of mobility daily  - Provide frequent reorientation  - Promote wakefulness i.e. lights on, blinds open  - Promote sleep, encourage patient's normal rest cycle i.e. lights off, TV off, minimize noise and interruptions  - Encourage family to assist in orientation and promotion of home routines  Outcome: Progressing      Problem: RISK FOR INFECTION - ADULT  Goal: Absence of fever/infection during anticipated neutropenic period  Description: INTERVENTIONS  - Monitor WBC  - Administer growth factors as ordered  - Implement neutropenic guidelines  Outcome: Progressing

## 2022-10-08 NOTE — PLAN OF CARE
80year old male. A/o x 1. Tube feedings Jevity 1.5 at 65mL/hr and flush with 250 ML every 4 hours. Iv zosyn administered per orders. Speech Evaluated today. Continue NPO. Tele V-paced  2L O2.    Problem: Patient/Family Goals  Goal: Patient/Family Long Term Goal  Description: Patient's Long Term Goal: To return to nursing home    Interventions:  - Telemetry monitoring     Bipap     Pulmonary consult     Urinary catheter  - See additional Care Plan goals for specific interventions  Outcome: Progressing  Goal: Patient/Family Short Term Goal  Description: Patient's Short Term Goal:  to get off BiPap    Interventions:   -Monitor ABGs and mental status, BiPap  - See additional Care Plan goals for specific interventions  Outcome: Progressing

## 2022-10-08 NOTE — SLP NOTE
SPEECH DAILY NOTE - INPATIENT    ASSESSMENT & PLAN   ASSESSMENT  The patient is an 80year old male with past medical history of dysphagia, CHF, dementia, CKD, HLD, seizure disorder. The patient is previously known to this service for evaluation and treatment with previous recommendation for puree consistency solids with mildly thick/ nectar thick liquids. The patient resides at [facility] and was admitted 10/01/22 with dyspnea. The patient was on a mechanical soft diet with mildly thick/nectar thick liquids at the SNF with a history of non-compliance with modified diet recommendations. The patient was seen in room, at bedside. The patient demonstrates preference to left side, attempted to reposition to midline. The patient was given trial of thin liquid via teaspoon. Noted decreased labial seal, slowed initiation of swallow response, with suspected decreased laryngeal elevation. The patient demonstrates wet vocal quality after trial.      The patient was given trials of nectar thick liquid via teaspoon. The patient demonstrates bolus holding of up to 10 seconds. Difficult to test vocal quality due to not following commands for voicing immediately; delayed by 5-7 seconds. Suspect decreased laryngeal elevation upon palpation. The patient was given trial of puree solids x2 with bolus holding of up to 20 seconds. No signs of aspiration noted after trial.     Diet Recommendations - Solids: NPO  Diet Recommendations - Liquids: NPO          Medication Administration Recommendations: Non-oral                     Discharge Recommendations  Discharge Recommendations/Plan: Sub-acute rehabilitation    Treatment Plan  Treatment Plan/Recommendations: Dysphagia therapy;Videofluoroscopic swallow study    Interdisciplinary Communication: Discussed with RN          GOALS  Goal #1 SLP to re-evaluate  Met   Goal #2 The patient will participate in VFSS to further evaluate swallow function within 2-3 days.   In Progress     FOLLOW UP  Follow Up Needed (Documentation Required): Yes  SLP Follow-up Date: 10/10/22  Number of Visits to Meet Established Goals: 5    Session: 2    If you have any questions, please contact Juan C Robertson, [address] [facility], [address] Pathologist [facility]  Office phone: [phone]  Pager: [phone], #[phone]

## 2022-10-09 ENCOUNTER — APPOINTMENT (OUTPATIENT)
Dept: ULTRASOUND IMAGING | Facility: HOSPITAL | Age: 87
End: 2022-10-09
Attending: HOSPITALIST
Payer: MEDICARE

## 2022-10-09 NOTE — PHYSICAL THERAPY NOTE
Duplicate order received. Pt was evaluated on 10/6/22 and f/u date for 10/10/22. Additional PT order completed.

## 2022-10-09 NOTE — PLAN OF CARE
80year old male a/o  x 1. Patient received his last dose of IV zosyn. Patient still receiving tube feedings. Jevity 1.5 @ 65mL/ hr and 250mL flush q 4 hours. Bilateral upper extremity doppler ordered to rule out DVT. Tele V paced.   on .5     L   of O2                  Axis III:   Past Medical History:   Diagnosis Date    BPH (benign prostatic hyperplasia)     Congestive heart disease (HCC)     Dementia (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 [address]     [facility]     2007 and 4-2011- right sided weakness    Syncope     Thyroid disease     Unspecified essential hypertension     Visual impairment     macular degeneration right eye post stroke        Problem: Patient/Family Goals  Goal: Patient/Family Long Term Goal  Description: Patient's Long Term Goal: To return to nursing home    Interventions:  - Telemetry monitoring     Bipap     Pulmonary consult     Urinary catheter  - See additional Care Plan goals for specific interventions  Outcome: Progressing  Goal: Patient/Family Short Term Goal  Description: Patient's Short Term Goal:  to get off BiPap    Interventions:   -Monitor ABGs and mental status, BiPap  - See additional Care Plan goals for specific interventions  Outcome: Progressing

## 2022-10-10 ENCOUNTER — APPOINTMENT (OUTPATIENT)
Dept: GENERAL RADIOLOGY | Facility: HOSPITAL | Age: 87
End: 2022-10-10
Attending: NURSE PRACTITIONER
Payer: MEDICARE

## 2022-10-10 NOTE — PROGRESS NOTES
Progress Note     Page Leslie Patient Status:  Inpatient    1935 MRN [id number]   [facility] 3NE-A Attending [facility] Day # 8 PCP Kylie Cook MD     Pt seen tried to open eyes once and attempted to say my name then closed again  Nothing to command today  Wearing mitts to prevent dislodging NG- not alert enough to try to feed pt    Has blister r heel- add drsg and foam boots to both feet  drsg on buttocks     Check CXR for congestion   Add chest pt   Add consult to urology - POA would like bladder assesses to determine if something in bladder caused bleeding when silva placed and he was on eliquis at time of admission.  He would like eliquis resumed   He says Jeri Mobley does not want a PEG  Placed    James Perry, CAESAR

## 2022-10-10 NOTE — PLAN OF CARE
Assumed care at [address]. A/O x self, confused. Nods appropriately. 1L NC, tachypneic. V paced on tele. No signs of pain noted. Incontinent, briefed. DH tube in R nare- intact. TF jevity 1.5 infusing at goal 65 ml/hr with 300 ml water flushes q4h. Aspiration prec. R PICC- intact, unit draw. Bilateral mitt restraints in place. Accuchecks q6h. NPO. Safety prec in place. Needs being met at this time. 2030 Dr. Dwain Damon notified of US results- warm compress applied to RUE. Problem: PAIN - ADULT  Goal: Verbalizes/displays adequate comfort level or patient's stated pain goal  Description: INTERVENTIONS:  - Encourage pt to monitor pain and request assistance  - Assess pain using appropriate pain scale  - Administer analgesics based on type and severity of pain and evaluate response  - Implement non-pharmacological measures as appropriate and evaluate response  - Consider cultural and social influences on pain and pain management  - Manage/alleviate anxiety  - Utilize distraction and/or relaxation techniques  - Monitor for opioid side effects  - Notify MD/LIP if interventions unsuccessful or patient reports new pain  - Anticipate increased pain with activity and pre-medicate as appropriate  Outcome: Progressing     Problem: RISK FOR INFECTION - ADULT  Goal: Absence of fever/infection during anticipated neutropenic period  Description: INTERVENTIONS  - Monitor WBC  - Administer growth factors as ordered  - Implement neutropenic guidelines  Outcome: Progressing     Problem: SAFETY ADULT - FALL  Goal: Free from fall injury  Description: INTERVENTIONS:  - Assess pt frequently for physical needs  - Identify cognitive and physical deficits and behaviors that affect risk of falls.   - [place] fall precautions as indicated by assessment.  - Educate pt/family on patient safety including physical limitations  - Instruct pt to call for assistance with activity based on assessment  - Modify environment to reduce risk of injury  - Provide assistive devices as appropriate    Fall precautions in progress  - Consider OT/PT consult to assist with strengthening/mobility  - Encourage toileting schedule  Outcome: Progressing     Problem: DISCHARGE PLANNING  Goal: Discharge to home or other facility with appropriate resources  Description: INTERVENTIONS:  - Identify barriers to discharge w/pt and caregiver  - Include patient/family/discharge partner in discharge planning  - Arrange for needed discharge resources and transportation as appropriate  - Identify discharge learning needs (meds, wound care, etc)  - Arrange for interpreters to assist at discharge as needed  - Consider post-discharge preferences of patient/family/discharge partner  - Complete POLST form as appropriate  - Assess patient's ability to be responsible for managing their own health  - Refer to Case Management Department for coordinating discharge planning if the patient needs post-hospital services based on physician/LIP order or complex needs related to functional status, cognitive ability or social support system  Outcome: Progressing     Problem: RESPIRATORY - ADULT  Goal: Achieves optimal ventilation and oxygenation  Description: INTERVENTIONS:  - Assess for changes in respiratory status  - Assess for changes in mentation and behavior  - Position to facilitate oxygenation and minimize respiratory effort  - Oxygen supplementation based on oxygen saturation or ABGs  - Provide Smoking Cessation handout, if applicable  - Encourage broncho-pulmonary hygiene including cough, deep breathe, Incentive Spirometry  - Assess the need for suctioning and perform as needed  - Assess and instruct to report SOB or any respiratory difficulty  - Respiratory Therapy support as indicated  - Manage/alleviate anxiety  - Monitor for signs/symptoms of CO2 retention  Outcome: Progressing     Problem: SKIN/TISSUE INTEGRITY - ADULT  Goal: Skin integrity remains intact  Description: INTERVENTIONS  - Assess and document risk factors for pressure ulcer development  - Assess and document skin integrity  - Monitor for areas of redness and/or skin breakdown  - Initiate interventions, skin care algorithm/standards of care as needed  Outcome: Progressing     Problem: NEUROLOGICAL - ADULT  Goal: Achieves stable or improved neurological status  Description: INTERVENTIONS  - Assess for and report changes in neurological status  - Initiate measures to prevent increased intracranial pressure  - Maintain blood pressure and fluid volume within ordered parameters to optimize cerebral perfusion and minimize risk of hemorrhage  - Monitor temperature, glucose, and sodium.  Initiate appropriate interventions as ordered  Outcome: Progressing     Problem: NEUROLOGICAL - ADULT  Goal: Absence of seizures  Description: INTERVENTIONS  - Monitor for seizure activity  - Administer anti-seizure medications as ordered  - Monitor neurological status  Outcome: Progressing     Problem: Delirium  Goal: Minimize duration of delirium  Description: Interventions:  - Encourage use of hearing aids, eye glasses  - Promote highest level of mobility daily  - Provide frequent reorientation  - Promote wakefulness i.e. lights on, blinds open  - Promote sleep, encourage patient's normal rest cycle i.e. lights off, TV off, minimize noise and interruptions  - Encourage family to assist in orientation and promotion of home routines  Outcome: Progressing     Problem: Diabetes/Glucose Control  Goal: Glucose maintained within prescribed range  Description: INTERVENTIONS:  - Monitor Blood Glucose as ordered  - Assess for signs and symptoms of hyperglycemia and hypoglycemia  - Administer ordered medications to maintain glucose within target range  - Assess barriers to adequate nutritional intake and initiate nutrition consult as needed  - Instruct patient on self management of diabetes  Outcome: Progressing

## 2022-10-10 NOTE — PROGRESS NOTES
Pt on 1 L NC, V paced on monitor. Tachynpic. Longevity1.5 @65 continues w/ 300 ml q 4 flush. Mitt restraints in place. Right arm edema. New Mepliex applied. POA was updated on care. Continue to monitor.

## 2022-10-11 NOTE — CM/SW NOTE
MSW, Charge Rn and RN discussed patient's post d/c needs in care rounds.      Plan of Care/Barriers to discharge:  Cysto at bedside on 10/12  2lpm o2, no avaps, baseline is ra    DC Plan:  Return to [facility] at dc

## 2022-10-11 NOTE — PLAN OF CARE
A/O x 1. Lethargic, nods occasionally, slurred speech. Mitt restraints so pt does not pull out dobhoff  1L per nc,   NPO with tube feeding Jevity 1.5 continuous at goal of 65ml hr with 300 ml water flush Q 4. Low residual. HOB 30'  Accucheck Q 6 glucose stable  Eliquis on hold  Tele Vpaced  PICC R ARM, R arm precautions  Mepilex on sacrum and barrier cream on perineal area and scrotum   Primofit draining clear yellow urine  Urology following- cysto?   Bed rest  Will continue to monitor      Problem: Patient/Family Goals  Goal: Patient/Family Long Term Goal  Description: Patient's Long Term Goal: To return to nursing home    Interventions:  - Telemetry monitoring     Bipap     Pulmonary consult     Urinary catheter  - See additional Care Plan goals for specific interventions  Outcome: Progressing  Goal: Patient/Family Short Term Goal  Description: Patient's Short Term Goal:  to get off BiPap    Interventions:   -Monitor ABGs and mental status, BiPap  - See additional Care Plan goals for specific interventions  Outcome: Progressing     Problem: PAIN - ADULT  Goal: Verbalizes/displays adequate comfort level or patient's stated pain goal  Description: INTERVENTIONS:  - Encourage pt to monitor pain and request assistance  - Assess pain using appropriate pain scale  - Administer analgesics based on type and severity of pain and evaluate response  - Implement non-pharmacological measures as appropriate and evaluate response  - Consider cultural and social influences on pain and pain management  - Manage/alleviate anxiety  - Utilize distraction and/or relaxation techniques  - Monitor for opioid side effects  - Notify MD/LIP if interventions unsuccessful or patient reports new pain  - Anticipate increased pain with activity and pre-medicate as appropriate  Outcome: Progressing     Problem: RISK FOR INFECTION - ADULT  Goal: Absence of fever/infection during anticipated neutropenic period  Description: INTERVENTIONS  - Monitor WBC  - Administer growth factors as ordered  - Implement neutropenic guidelines  Outcome: Progressing     Problem: SAFETY ADULT - FALL  Goal: Free from fall injury  Description: INTERVENTIONS:  - Assess pt frequently for physical needs  - Identify cognitive and physical deficits and behaviors that affect risk of falls.   - [place] fall precautions as indicated by assessment.  - Educate pt/family on patient safety including physical limitations  - Instruct pt to call for assistance with activity based on assessment  - Modify environment to reduce risk of injury  - Provide assistive devices as appropriate    Fall precautions in progress  - Consider OT/PT consult to assist with strengthening/mobility  - Encourage toileting schedule  Outcome: Progressing     Problem: DISCHARGE PLANNING  Goal: Discharge to home or other facility with appropriate resources  Description: INTERVENTIONS:  - Identify barriers to discharge w/pt and caregiver  - Include patient/family/discharge partner in discharge planning  - Arrange for needed discharge resources and transportation as appropriate  - Identify discharge learning needs (meds, wound care, etc)  - Arrange for interpreters to assist at discharge as needed  - Consider post-discharge preferences of patient/family/discharge partner  - Complete POLST form as appropriate  - Assess patient's ability to be responsible for managing their own health  - Refer to Case Management Department for coordinating discharge planning if the patient needs post-hospital services based on physician/LIP order or complex needs related to functional status, cognitive ability or social support system  Outcome: Progressing     Problem: RESPIRATORY - ADULT  Goal: Achieves optimal ventilation and oxygenation  Description: INTERVENTIONS:  - Assess for changes in respiratory status  - Assess for changes in mentation and behavior  - Position to facilitate oxygenation and minimize respiratory effort  - Oxygen supplementation based on oxygen saturation or ABGs  - Provide Smoking Cessation handout, if applicable  - Encourage broncho-pulmonary hygiene including cough, deep breathe, Incentive Spirometry  - Assess the need for suctioning and perform as needed  - Assess and instruct to report SOB or any respiratory difficulty  - Respiratory Therapy support as indicated  - Manage/alleviate anxiety  - Monitor for signs/symptoms of CO2 retention  Outcome: Progressing     Problem: SKIN/TISSUE INTEGRITY - ADULT  Goal: Skin integrity remains intact  Description: INTERVENTIONS  - Assess and document risk factors for pressure ulcer development  - Assess and document skin integrity  - Monitor for areas of redness and/or skin breakdown  - Initiate interventions, skin care algorithm/standards of care as needed  Outcome: Progressing     Problem: NEUROLOGICAL - ADULT  Goal: Achieves stable or improved neurological status  Description: INTERVENTIONS  - Assess for and report changes in neurological status  - Initiate measures to prevent increased intracranial pressure  - Maintain blood pressure and fluid volume within ordered parameters to optimize cerebral perfusion and minimize risk of hemorrhage  - Monitor temperature, glucose, and sodium.  Initiate appropriate interventions as ordered  Outcome: Progressing  Goal: Absence of seizures  Description: INTERVENTIONS  - Monitor for seizure activity  - Administer anti-seizure medications as ordered  - Monitor neurological status  Outcome: Progressing     Problem: Delirium  Goal: Minimize duration of delirium  Description: Interventions:  - Encourage use of hearing aids, eye glasses  - Promote highest level of mobility daily  - Provide frequent reorientation  - Promote wakefulness i.e. lights on, blinds open  - Promote sleep, encourage patient's normal rest cycle i.e. lights off, TV off, minimize noise and interruptions  - Encourage family to assist in orientation and promotion of home routines  Outcome: Progressing     Problem: Diabetes/Glucose Control  Goal: Glucose maintained within prescribed range  Description: INTERVENTIONS:  - Monitor Blood Glucose as ordered  - Assess for signs and symptoms of hyperglycemia and hypoglycemia  - Administer ordered medications to maintain glucose within target range  - Assess barriers to adequate nutritional intake and initiate nutrition consult as needed  - Instruct patient on self management of diabetes  Outcome: Progressing

## 2022-10-11 NOTE — SLP NOTE
SPEECH DAILY NOTE - INPATIENT    ASSESSMENT & PLAN   ASSESSMENT  Patient seen today for ongoing assessment of oropharyngeal swallow. Patient more alert today; able to answer simple yes/no questions. He was agreeable to PO trials. Patient with history of modified diet and consumed a mechanical soft diet with mildly thick liquids at baseline. Per chart review, pt noncompliant with thickened liquids at NH and now hospitalized with suspected aspiration pneumonia. PO trials of mildly thick liquids and pureed solids provided. Minimal anterior bolus loss noted when presenting liquid via spoon. Bolus formation and AP transit were prolonged but thorough with both puree and mildly thick liquids. Pharyngeal swallow initiation appeared timely and hyolaryngeal excursion was adequate per palpation. No overt s/s of aspiration observed and patient denied odynophagia and globus sensation across consistencies. Recommend patient initiate a pureed diet and mildly thick liquids as long as patient remain alert and able to adequately participate. Bolus size and rate of intake should be limited. Medications crushed and administered in a pureed bolus. Patient should be in an upright seated position for all PO intake. SLP will continue to follow to monitor diet tolerance and adjust as appropriate. Education provided re: results and recommendations.     Diet Recommendations - Solids: Puree  Diet Recommendations - Liquids: Nectar thick liquids/ Mildly thick    Compensatory Strategies Recommended: Small bites and sips  Aspiration Precautions: Upright position  Medication Administration Recommendations: Crushed in puree                     Discharge Recommendations  Discharge Recommendations/Plan: Sub-acute rehabilitation    Treatment Plan  Treatment Plan/Recommendations: Dysphagia therapy    Interdisciplinary Communication: Discussed with RN            GOALS  Goal #1 The patient will tolerate puree consistency and mildly thick liquids without overt signs or symptoms of aspiration with 90 % accuracy over 1-2 session(s). In Progress   Goal #2 The patient/family/caregiver will demonstrate understanding and implementation of aspiration precautions and swallow strategies independently over 1-2 session(s). In Progress   Goal #3 VFSS if clinically indicated.   In Progress                              FOLLOW UP  Follow Up Needed (Documentation Required): Yes  SLP Follow-up Date: 10/12/22  Number of Visits to Meet Established Goals: 5    Session: 4    If you have any questions, please contact Tata Bustamante, SLP

## 2022-10-12 NOTE — CM/SW NOTE
MSW sent updates via Aidin to: [facility]  [address]  [place], [address]  Phone: [phone]  Fax: [phone]    SW will continue to monitor for dc.     Jacki Zepeda LCSW

## 2022-10-12 NOTE — PHYSICAL THERAPY NOTE
Physical Therapy    Attempted treatment this a.m., but plan for bedside cystoscopy. Will re-attempt as schedule and pt's medical stability allow.

## 2022-10-12 NOTE — PHYSICAL THERAPY NOTE
PHYSICAL THERAPY TREATMENT NOTE - INPATIENT    Room Number: 7583/4160-K     Session: 1    Number of Visits to Meet Established Goals: 5    Presenting Problem: Metabolic alkalosis  Co-Morbidities : BPH, CHF, dementia, CKD, HLD, sz dx   History related to current admission: Patient is a 80year old male admitted on 10/1/2022 from [facility] for SOB. Pt diagnosed with Metabolic alkalosis. CT abdomen/pelvis 10/3/22-Impression  CONCLUSION:    1. There is stranding around bladder. This could indicate cystitis but is nonspecific. There is a Lyons catheter in the bladder which is otherwise collapse. 2. Multiple low-density lesions in the kidneys have imaging appearance suggesting these are most likely cysts, however, these are not well characterized on a noncontrast CT. Ultrasound or contrast-enhanced CT or MRI would be more specific. 3. There is a compression deformity at T11. There are multiple lucent foci scattered in the spine. These are nonspecific. Metastatic disease or myeloma could have this appearance. Atypical benign hemangiomas could have this appearance. Lumbar spine   MRI would be more specific. 4. Multiple low-density lesions in the liver are most likely cysts. These are also not well characterized without contrast.   5. 1 cm right adrenal nodule is nonspecific but is most likely an adrenal adenoma. 6. There is a fat containing periumbilical hernia. 7. There is diverticulosis of the colon without CT evidence of diverticulitis. Ct of brain 10/3/22-  CONCLUSION:    1. 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 No acute intracranial hemorrhage, mass effect or midline shift. 2. Retention cyst or polyp noted in the left maxillary sinus. 3. No acute intracranial hemorrhage, mass effect or midline shift. ASSESSMENT     Pt w/ ability to follow some commands this session - particularly w/ ue's. Able to tell me his name.  Fatigues very quickly w/ minimal activity. Le's are quite contracted. Per niece he is significantly different that when she saw pt in August. Patient will cont to benefit from skilled PT to improve strength, sitting balance, le stretching, and cardiopulmonary endurance to insure highest level of function prior to discharge. DISCHARGE RECOMMENDATIONS  PT Discharge Recommendations: Sub-acute rehabilitation; Other (Comment) (TRIAL vs LTC c restorative)     PLAN  PT Treatment Plan: Bed mobility; Body mechanics; Endurance; Energy conservation;Patient education;Strengthening;Transfer training;Balance training  Rehab Potential : Fair  Frequency (Obs): 3-5x/week    CURRENT GOALS     Goal #1 Patient is able to demonstrate supine - sit EOB @ level: moderate assistance      Goal #2 Patient is able to demonstrate transfers EOB to/from Chair/Wheelchair at assistance level: maximum assistance      Goal #3 Assess amb as appropriate   Goal #4 Pt will demo ability to complete HEP c intermittent cues by DC   Goal #5     Goal #6     Goal Comments: Goals established on 10/6/2022    10/12/2022 all goals ongoing      SUBJECTIVE  \"OK\" - minimal verbalizations during session and difficulty maintaining alert level. OBJECTIVE  Precautions: Aspiration;Bed/chair alarm;Hard of hearing; Other (Comment) (Dobhoff for tube feeds, LE contractures)    WEIGHT BEARING RESTRICTION  Weight Bearing Restriction: None                PAIN ASSESSMENT   Rating: Unable to rate  Location: Pt did grimace and resist movement of R ue at times  Management Techniques:  Activity promotion;Repositioning    BALANCE                                                                                                                       Static Sitting: Poor  Dynamic Sitting: Poor -           Static Standing: Not tested  Dynamic Standing: Not tested    ACTIVITY TOLERANCE  Pulse: 60  Heart Rate Source: Monitor  Resp: (!) 32                O2 WALK  Oxygen Therapy  SPO2% on Room Air at Rest: 80      AM-PAC '6-Clicks' [address] [place]  How much difficulty does the patient currently have. .. Patient Difficulty: Turning over in bed (including adjusting bedclothes, sheets and blankets)?: A Lot   Patient Difficulty: Sitting down on and standing up from a chair with arms (e.g., wheelchair, bedside commode, etc.): Unable   Patient Difficulty: Moving from lying on back to sitting on the side of the bed?: Unable   How much help from another person does the patient currently need. .. Help from Another: Moving to and from a bed to a chair (including a wheelchair)?: Total   Help from Another: Need to walk in hospital room?: Total   Help from Another: Climbing 3-5 steps with a railing?: Total       AM-PAC Score:  Raw Score: 7   Approx Degree of Impairment: 92.36%   Standardized Score (AM-PAC Scale): 26.42   CMS Modifier (G-Code): CM    FUNCTIONAL ABILITY STATUS  Gait Assessment   Functional Mobility/Gait Assessment  Gait Assistance: Not tested  Distance (ft): n/a    Skilled Therapy Provided    Bed Mobility:  Rolling right: Max a of 1   Rolling left: Max a of 1    Supine<>Sit: Max a of 2  Once in sitting, pt required mod to max a of to maintain sitting balance. Note lob to his Right and posteriorly. Pt attempting to use ue's to adjust, but occasionally pushing too far. Tolerated about 5 min sitting eob. Was able to kick L le w/i a small range while sitting on eob, but not his R.    Sit<>Supine: Max a of 2     Transfer Mobility:  Sit<>Stand: NT   Stand<>Sit: NT   Gait: NT    Therapist's Comments: Positioned pt in chair mode at end of session (hob slightly declined). Completed hip ab/add stretches and hamstring stretch bilaterally 3 reps for 20 seconds, but very tight contracted le's. Pillow support under both arms to assist w/ swelling and pillow between legs to prevent skin breakdown at knees to contracted posture.       THERAPEUTIC EXERCISES  Lower Extremity See above     Upper Extremity      Position Supine     Repetitions   3 reps for 20 second hold   Sets   1     Patient End of Session: In bed;Needs met;Call light within reach;RN aware of session/findings; All patient questions and concerns addressed; Alarm set; Family present (Rns Gael and [place] aware of treatment session)

## 2022-10-12 NOTE — SLP NOTE
SLP visit attempted to monitor diet tolerance following initiation of a pureed diet and mildly thick liquids yesterday. Patient drowsy but arousable in bed. He politely refused all food/drink offered. RN also reported limited PO intake yesterday. Will continue to follow per POC.

## 2022-10-12 NOTE — PROGRESS NOTES
Obtained verbal consent from POA for cystocopy  Pt continues on mitt restraints  Pt tolerated puree nectar thick, but refused anything at lunchtime. Longevity continuing, 22 hrs. Pt had 1 loose stool. Mikaela urine  101.1 temp, Tylenol given.

## 2022-10-13 ENCOUNTER — APPOINTMENT (OUTPATIENT)
Dept: GENERAL RADIOLOGY | Facility: HOSPITAL | Age: 87
End: 2022-10-13
Attending: HOSPITALIST
Payer: MEDICARE

## 2022-10-13 NOTE — SIGNIFICANT EVENT
0030: Patient SpO2 drop to 57%. Tachypneic, Respiration 30's. Patient lethargic, non verbal most of the time (close to baseline). Non-rebreather mask applied. Suctioned and a large amount of white, frothy secretions removed. RRT was called. Temp (100.2), WBC (13.5). IV Zosyn started for PNA. Labs done. ABG done. Repeat CXR done, suspecting pulmonary edema per MD-  IV Lasix once given. O2 improved abut still requiring oxygen. Currently on 7L [place] NC, SpO2 above 95%.

## 2022-10-13 NOTE — PLAN OF CARE
80year old male a/o x 1. Patient weaned from 4L to 2L of O2. Respirations are elevated ranging between 27-35. Tele V paced. .    Problem: Patient/Family Goals  Goal: Patient/Family Long Term Goal  Description: Patient's Long Term Goal: To return to nursing home    Interventions:  - Telemetry monitoring     Bipap     Pulmonary consult     Urinary catheter  - See additional Care Plan goals for specific interventions  Outcome: Progressing  Goal: Patient/Family Short Term Goal  Description: Patient's Short Term Goal:  to get off BiPap  -Rest well    Interventions:  -cluster care   -Monitor ABGs and mental status, BiPap  - See additional Care Plan goals for specific interventions  Outcome: Progressing

## 2022-10-13 NOTE — PROGRESS NOTES
Assumed patient care at 1. Patient A&Oxself. Lethargic, nods occasionally, has been nonverbal most of the time. 1 L NC, . SpO2 above 90%. V paced on tele. Has been having low grade fever this evening. Tylenol given, ice packs applied. Nectar Thicked Fluids/ Pureed diet. Aspiration precaution. (R) Nare Dobhoff-TF Jevity 1.5 continuous at goal of 65ml/hr with 300ml water flush Q4h. Mitt restraints to avoid pulling Dobhoff. Accu checks Q6h. PICC (R) Arm- (R)arm precaution. PIV (R) hand - IVF discontinued around 8PM. SL. Incontinent. Primofit in place. Mepilex on sacrum and barrier cream applied on perineal area. Bedrest. Turn x 2-3 maximum assist.  Updated POA on plan of care. All needs met at this time.

## 2022-10-14 NOTE — PHYSICAL THERAPY NOTE
PHYSICAL THERAPY TREATMENT NOTE - INPATIENT    Room Number: 3300/2561-M     Session: 1     Number of Visits to Meet Established Goals: 5    Presenting Problem: Metabolic alkalosis  Co-Morbidities : BPH, CHF, dementia, CKD, HLD, sz dx  ASSESSMENT     Pt non verbal, follows some commands, presents with BLE contractures, impaired strength and decreased endurance. The AM-PAC '6-Clicks' Inpatient Basic Mobility Short Form was completed and this patient is demonstrating a 100% degree of impairment in mobility. DISCHARGE RECOMMENDATIONS  PT Discharge Recommendations: Sub-acute rehabilitation; Other (Comment) (TRIAL vs LTC c restorative)     PLAN  PT Treatment Plan: Bed mobility; Body mechanics; Endurance; Energy conservation;Patient education;Strengthening;Transfer training;Balance training  Rehab Potential : Fair  Frequency (Obs): 3-5x/week    CURRENT GOALS        Goal #1 Patient is able to demonstrate supine - sit EOB @ level: moderate assistance      Goal #2 Patient is able to demonstrate transfers EOB to/from Chair/Wheelchair at assistance level: maximum assistance      Goal #3 Assess amb as appropriate   Goal #4 Pt will demo ability to complete HEP c intermittent cues by DC   Goal #5     Goal #6     Goal Comments: Goals established on 10/6/2022      10/14/2022 all goals ongoing    SUBJECTIVE  Pt non verbal, occasional grunts     OBJECTIVE  Precautions: Aspiration;Bed/chair alarm;Hard of hearing; Other (Comment) (Dobhoff for tube feeds, LE contractures)    WEIGHT BEARING RESTRICTION  Weight Bearing Restriction: None                PAIN ASSESSMENT   Rating: Unable to rate  Location: no pain behaviors noted   Management Techniques:  Activity promotion;Repositioning    BALANCE                                                                                                                       Static Sitting: Poor  Dynamic Sitting: Poor -           Static Standing: Not tested  Dynamic Standing: Not tested    ACTIVITY TOLERANCE                        O2 WALK         AM-PAC '6-Clicks' INPATIENT SHORT FORM - BASIC MOBILITY  How much difficulty does the patient currently have. .. Patient Difficulty: Turning over in bed (including adjusting bedclothes, sheets and blankets)?: Unable   Patient Difficulty: Sitting down on and standing up from a chair with arms (e.g., wheelchair, bedside commode, etc.): Unable   Patient Difficulty: Moving from lying on back to sitting on the side of the bed?: Unable   How much help from another person does the patient currently need. .. Help from Another: Moving to and from a bed to a chair (including a wheelchair)?: Total   Help from Another: Need to walk in hospital room?: Total   Help from Another: Climbing 3-5 steps with a railing?: Total       AM-PAC Score:  Raw Score: 6   Approx Degree of Impairment: 100%   Standardized Score (AM-PAC Scale): 23.55   CMS Modifier (G-Code): CN    FUNCTIONAL ABILITY STATUS  Gait Assessment   Functional Mobility/Gait Assessment  Gait Assistance: Not tested  Distance (ft): n/a    Skilled Therapy Provided  Pt presents in semi sup. B mitts on to prevent pt from pulling NGT . Pt performed AAROM BUE BLE therex in all planes. B HS stretch x 3 provided. Pt does not indicate pain or present with pain behaviors, and is able to follow most commands. L lean noted in bed, TA to reposition . THERAPEUTIC EXERCISES  Lower Extremity Ankle pumps  Hip AB/AD  Heel slides  Leg slides  SLR  B HS stretch   Upper Extremity Elbow flex/ext and Wrist flex/ext     Position Supine     Repetitions   10-20   Sets   1     Patient End of Session: In bed;Needs met;Call light within reach;RN aware of session/findings; Alarm set

## 2022-10-15 ENCOUNTER — HOSPITAL ENCOUNTER (INPATIENT)
Facility: HOSPITAL | Age: 87
End: 2022-10-15
Attending: HOSPITALIST | Admitting: HOSPITALIST
Payer: OTHER MISCELLANEOUS

## 2022-10-15 PROBLEM — J69.0 RECURRENT ASPIRATION PNEUMONIA (HCC): Status: ACTIVE | Noted: 2022-10-15

## 2022-10-15 PROBLEM — J69.0 RECURRENT ASPIRATION PNEUMONIA (HCC): Status: ACTIVE | Noted: 2022-01-01

## 2022-10-15 NOTE — HOSPICE RN NOTE
[facility] is meeting with guardian Dr. Tracey Burris tomorrow 10/15/22. He will be here between 1-3 pm. Please call [facility] when he arrives at [phone]. Please call [facility] with any questions or concerns.     Dipak Munroe RN, [address] Liaison  [phone] after hours

## 2022-10-15 NOTE — PLAN OF CARE
Assumed patient care at [address] [place] [address]. Patient is A&O- self, nods occasionally, nonverbal most of the time. Received patient 1 L NC, . SpO2 above 95%. Tachypneic HR 20-30's. V paced on tele. HR High 50's-low 60's. IV Metoprolol held per MD.  [place] Thicked Fluids/ Pureed diet. Very poor appetite. Aspiration precaution. (R) Nare Dobhoff-TF Jevity 1.5 continuous at 65ml/hr(goal) with 300ml water flush Q4h. Accu checks Q6h. Mitt restraints to avoid pulling Dobhoff. PIV (R) hand - SL. PICC (R) Arm- SL.  (R)arm precaution. On IV Zosyn Q8h for PNA. Incontinent. Brief/Primofit in place. Mepilex on sacrum and barrier cream applied on perineal area. Bedrest. Turn x 2-3 maximum assist. All needs met at this time.

## 2022-10-15 NOTE — PROGRESS NOTES
Assumed care at 0700. Alert to self. Arouses at times but mainly drowsy. Remains tachypnic w/rates in the 30's/40's. NG tube removed upon arrival of POA per POA request. Patient will be transitioning to hospice. Sepsis warning fired with daily labs. MD aware. q2 hr turn. Sacral breakdown noted. Mepilex applied. Right heel pressure blister. Bunny boots have been in place. Arms swollen. HCPOA at bedside and will meet with hospice early this evening. Staff will continue to monitor.

## 2022-10-15 NOTE — PROGRESS NOTES
Assumed care at 0700, Alert to self. Patient tachypnic with rates variable from mid 20's to low 40's. 1 LNC and sats mid 90's. Bed bound. Sacral breakdown. Mepilex applied. Arms elevated and heat applied for redness/swelling. Tube feed flush rate adjusted by neph. [id number] flush. IV lasix for diuresis. Frequent Yankauer suctioning. q2hr turn. POA updated and will come visit patient tomorrow to make hospice decision. Hospice consulted and aware. Staff will continue to monitor.

## 2022-10-16 NOTE — PROGRESS NOTES
10/15/22 1911   Clinical Encounter Type   Visited With Patient not available  (Patient was resting when I stopped by. I respected his need to rest and did not try to wake him.)   Routine Visit Introduction   Continue Visiting Yes   Lenard Grewal remains available at pager #[phone] for spiritual care and/or emotional support as needed.

## 2022-10-16 NOTE — HOSPICE RN NOTE
80YEAR OLD MALE WITH TERMINAL DX OF RECURRENT ASPIRATION PNEUMONIA AND RESPIRATORY FAILURE.  PMH: CVA, CHF, CKD, HLD, SEIZURE DISORDER, DEMENTIA. ADMITTED 10/1/22 WITH DYSPNEA AND HEMATURIA AND MANAGEMENT OF ACUTE HYPERCAPNIC RESPIRATORY FAILURE SUSPECTED 2/2 ASPIRATION D/T NON ADHERED DYSPHAGIA DIET. PULMONARY EDEMA WITH H/O HF.  CHEST XRAY SHOWED LEFT PLEURAL EFFUSION INCREASED IN SIZE AND MILDLY INCREASED CONSOLIDATION IN LEFT LOWER LOBE POSSIBLY D/T WORSENING PNEUMONIA OVER COURSE OF HOSPITALIZATION. HOSPITAL COURSE INCLUDED BIPAP IN ICU AND DIURESIS. SEVERAL RRT CALLED THIS HOSPITALIZATIONS INCLUDING LAST ONE THIS AM 10/15/22 FOR DE STAT. RR CURRENTLY ELEVATED IN 30'S WITH SHALLOW BREATHS. WBC 15.9, HGB 7.8, ALBUMIN 1.7. NEPHEW AND POA IN AGREEMENT WITH HOSPICE PHILOSOPHY AND SERVICES AND SIGNED HOSPICE CONSENTS. PATIENT HAS BILATERAL UPPER EXTREMITY WEEPING EDEMA. ARMS ARE RED AND WARM TO TOUCH. RIGHT HEEL DTI, BUNNY BOOTS APPLIED AND HEELS FLOATING TO PREVENT FURTHER SKIN BREAKDOWN. PATIENT IS MINIMALLY RESPONSIVE BUT CAN FOLLOW SIMPLE INSTRUCTIONS SUCH AS SQUEEZE HANDS AND WIGGLE TOES. PATIENT IS ELIGIBLE FOR HOSPICE INPATIENT LEVEL OF SERVICES D/T UNMANAGED DYSPNEA. NEPHEW WOULD LIKE TO HAVE MORPHINE IVP FOR PT'S DYSPNEA BUT NOT READY TO BEGIN MORPHINE GTT AT THIS TIME. WILL CONTINUE TO MONITOR FOR COMFORT AND DIGNITY.

## 2022-10-16 NOTE — PLAN OF CARE
Assumed Pt care @3437. A/Oxself. 2L O2 as needed for comfort. . No tele. Morphine PRN for restlessness and discomfort. General management of Stage II pressure ulcer coccyx (mepilex). Jemma skin breakdown/reddness. Bilateral arms= red, swollen and weeping. PRN medications for secretion management. Scopolamine patch Q72 R ear. Pleasure feeds. R cephalic picc SL. All needs met at this time. Safety prec in place. Comfort measures in place. Bed in lowest position. Transition to hospice care.             Problem: Patient/Family Goals  Goal: Patient/Family Long Term Goal  Description: Patient's Long Term Goal: Home vs [facility] on Hospice    Interventions:  - Comfort care  - Hospice  - See additional Care Plan goals for specific interventions  Outcome: Progressing  Goal: Patient/Family Short Term Goal  Description: Patient's Short Term Goal: Comfort    Interventions:   - Comfort care on hospice  - Determine best POC with hospice orders  - Morphine Q1 PRN  - PRNs for secretion management   - Scopolamine patch Q72  - See additional Care Plan goals for specific interventions  Outcome: Progressing

## 2022-10-16 NOTE — PROGRESS NOTES
10/16/22 3643   Clinical Encounter Type   Visited With Family; Health care provider  (RN Sai Cruz confirmed patient in hospice and to call Verner Humble)   Continue Visiting Yes   Sacramental Encounters   Sacrament of Sick-Anointing Family requested anointing   Taxonomy   Intended Effects Aligning care plan with patient's values   Interventions [place]   Patient responded to a consult from Hospice.  called relative Jennifer Whalen who said patient would like anointing.  called [address] answering service and left message to anoint patient on Monday. Father Javad Estrada of [facility] confirmed he will be by tomorrow, Monday, morning about 9:30 a.m. to provide the anointing.

## 2022-10-16 NOTE — CM/SW NOTE
SW met with pt in room, Sleeping, continuing to breath quickly. Called nephew for update. No additional needs at this time.

## 2022-10-17 NOTE — HOSPICE RN NOTE
[facility] inpatient nursing visit. No family at bedside. Patient responsive to simple questions. Denies pain at this time. Last VS /50, HR 58, RR 32, Temp 98. 8. breathing shallow, tachypnea. Discussed patient status with [facility] RN. Requested dose of IVP morphine for his RR of 32-36. . patient remains appropriate for inpatient level of hospice care today. Will work with [facility] on a DC plan for transition to routine level of hospice care. [facility] will continue to follow this patient closely for end of life symptom management and to support family. please call [facility] with any questions or concerns. Julane Fothergill RN, [address] [facility] Liaison  [phone] after hours    Addendum: Spoke with [facility] Dr. Annie Allan by phone with update on patient condition with questions and concerns addressed. Did discuss potential transition to routine level of hospice at [facility].

## 2022-10-17 NOTE — PLAN OF CARE
Patient alert to self. Patient resting in bed, appears comfortable, no apparent distress. POA did visit at bedside, reports that patient looks comfortable. Patient remains on 2L NC, no tele or  monitoring at this time. Patient incontinent, briefed with primofit. Patient with wounds, see Flowsheets for details. Patient is a lab draw, unable to aspirate blood from PICC. Dressing changed on PICC. Fall precautioms in place. Call light in reach. Limb precautions in place.      Problem: Patient/Family Goals  Goal: Patient/Family Long Term Goal  Description: Patient's Long Term Goal: comfort    Interventions:  - Palliative/hospice consult  -comfort measure  - See additional Care Plan goals for specific interventions  Outcome: Progressing  Goal: Patient/Family Short Term Goal  Description: Patient's Short Term Goal:   10/16 noc: comfort, rest    Interventions:   - PRN intervention  -Hospice  - See additional Care Plan goals for specific interventions  Outcome: Progressing

## 2022-10-17 NOTE — PROGRESS NOTES
10/17/22 1140   Clinical Encounter Type   Visited With Health care provider   Routine Visit Follow-up   Referral To   [facility] )   Sacramental Encounters   Sacrament of Sick-Anointing Anointed   The patient was seen by Serenity [place]. Received prayer, Scripture, support and Sacrament of the Sick. Spiritual Care support can be requested via an Epic consult.

## 2022-10-17 NOTE — PLAN OF CARE
Patient alert and oriented to self   Speech slow, long pauses   Hospice/comfort measures, Qshift VS  No c/o pain, pt on 2L NC, SpO2 93%  Non tele monitoring   Generalized edema, BUE 3+ pitting edema   Right arm precaution d/t TL PICC, SZR precautions   Pleasure feeds   Per hospitalist patient is to be discharged for outpatient hospice, [facility] notified   Safety precautions in place     PRN Morphine given twice, first time IVP, second time oral solution was given, patient was able to tolerate oral morphine      Problem: Patient/Family Goals  Goal: Patient/Family Long Term Goal  Description: Patient's Long Term Goal: comfort    Interventions:  - Palliative/hospice consult  -comfort measure  - See additional Care Plan goals for specific interventions  Outcome: Progressing  Goal: Patient/Family Short Term Goal  Description: Patient's Short Term Goal:   10/16 noc: comfort, rest    Interventions:   - PRN intervention  -Hospice  - See additional Care Plan goals for specific interventions  Outcome: Progressing

## 2022-10-17 NOTE — HOSPICE RN NOTE
Order received and entered for oral morphine to trial before transferring to facility. Per Dr. Sp Beauchamp patient may have morphine 5 mg PO every 1 hr as needed for pain or SOB. Order received and entered.     Dorys Fink RN, [address] [place] [address] Liaison  [phone] after hours

## 2022-10-17 NOTE — PLAN OF CARE
Pt alert to name. sleeping, remains comfortable. 2L NC for comfort. BUE remain edematous. Repositioned throughout shift. Mepilex changed, pt given 1 mg morphine after dressing change.    Continue to monitor

## 2022-10-17 NOTE — HOSPICE RN NOTE
[facility] inpatient nursing rounds. No family at bedside. Patient appears unresponsive at this time. No objective signs of distress noted. Patient required 1 dose IVP morphine today and did get PO dose of 5 mg for management of pain and SOB. Remains appropriate for inpatient level of hospice care. Will evaluate tomorrow morning for ability to transition to routine level of hospice care. [facility] will continue to follow this patient closely for end of life symptom management and to support family. Please call [facility] with any questions or concerns.     Derryl Schaumann RN, [address] [facility] Liaison  [phone] after hours

## 2022-10-18 NOTE — PLAN OF CARE
Pt on hospice. Comfort care provided. Follows some command. Answers simple questions. A&O x1. Pt seems comfortable. Generalized edema. R arm precautions, PICC. Seizure precautions in place. Primofit in place. Updated POA. All needs met at this time.

## 2022-10-18 NOTE — HOSPICE RN NOTE
[facility] inpatient nursing visit. No family at bedside. Patient with eyes closed. Responsive to simple questions. Patient denies pain. No objective signs of distress noted at this time. Last VS /55, HR 62, RR 36, temp 99.3 oral. Discussed patient status with hospital nurse [facility] RN. Requested patient be given dose of PO morphine for tachypnea. Patient was admitted to inpatient hospice for management of respiratory distress. Lung sounds diminished, intermittent moist cough. Planning on potential transition to routine level of hospice care at [facility]. [facility] not in agreement, notified Dr. Renee Gooden. [facility] will continue to follow this patient closely for end of life symptom management and to support family. Please call [facility] with any questions or concerns.       Figueroa Bejarano RN, [address] Liaison  [phone] after hours

## 2022-10-18 NOTE — CM/SW NOTE
Medicare Letter  left by bedside, MSW will f/u with family arrives. 2:18pm  MSW spoke to pt's Guardian and faxed him IM letter Andres Gutierrez), asked him to complete appeal today. He states he will be in tonight and pt will not leave. 3:24   Pt guardian called back and requested pt's insurance info, [facility]

## 2022-10-18 NOTE — PLAN OF CARE
Assumed care at 0730. Pt is A&Ox1 person, pt can nod to questions, minimal words. RA, BP within parameters, low grade temp of 99.9 F-ice packs applied. No tele. Oral morphine given d/t tachypnea, pt tolerated well. Inpatient Hospice. Pleasure feeds, poor oral intake, for breakfast pt had 5 spoons of thicken apple juice and half cup of ice cream.  Right arm picc line-no blood return, saline lock. Scopolamine on. Sz precautions on. Bunny boots in place. Primofit-yellow urine. Bed in lowest position, call light within reach. Morphine IV given for tachypnea. No BM on this shift. Sacral wound-nonblanchable, mepilex applied.    Problem: PAIN - ADULT  Goal: Verbalizes/displays adequate comfort level or patient's stated pain goal  Description: INTERVENTIONS:  - Encourage pt to monitor pain and request assistance  - Assess pain using appropriate pain scale  - Administer analgesics based on type and severity of pain and evaluate response  - Implement non-pharmacological measures as appropriate and evaluate response  - Consider cultural and social influences on pain and pain management  - Manage/alleviate anxiety  - Utilize distraction and/or relaxation techniques  - Monitor for opioid side effects  - Notify MD/LIP if interventions unsuccessful or patient reports new pain  - Anticipate increased pain with activity and pre-medicate as appropriate  Outcome: Progressing

## 2022-10-19 NOTE — PLAN OF CARE
Received pt. In bed on 2l NC. Follows very simple commands like open your mouth. Neuro status unchanged. Troy stopped by ratna to discuss pt's possible transfer to  [facility]. He was moving pt's. Extremities and pt. was grimacing and moaning in pain. Elena Baker stated he did not want him transferred yet because it would be too painful. Scheduled Morphine given and one extra dose also given this shift. Elena Baker also told me not to turn his uncle because it would be too painful. Pt. Rested all night. Resp status stable all night.

## 2022-10-19 NOTE — CM/SW NOTE
SW met with pt in room. Pt having high fever , 02 sats declining, grimacing. Niece in room ,  Answered questions , educated on eol process.

## 2022-10-19 NOTE — HOSPICE RN NOTE
Pt seen at 2:30p  Noted with RR 30, abdominal accessory muscle use, shallow, pt overall pale/grey in appearance. He does respond to to touch with slight pressure by opening eyes but no appearance of recognition of anyone at time of visit. Pt has moderate secretions. Phone call placed to nephew Dr. Wells Both who could only talk briefly and then called me back to discuss symptom management needs about 45 minutes later. Physical assessment observations were reviewed with him including recommendations of IV drip MS for more consistent management of dyspnea, continue prn robinul for congestion and IV drip acetaminophen for high temps. He is agreeable to starting MS drip including titration per orders prn based on symptom management needs. He is agreeable with plan for robinul and acetaminophen. Dr. Odessia Burkitt contacted to obtain order for IV acetaminophen; awaiting callback. Nephew indicated that he plans to visit this evening. Plan, including nephew's plan to visit, reviewed with RN Bárbara Cadet, Dr. Odessia Burkitt and CASH Mcclellan.    4:13p:  verbalorder ob tained for IV drip acetaminophen.

## 2022-10-19 NOTE — HOSPICE RN NOTE
[facility] inpatient nursing rounds. No family at bedside. Patient appears resting in bed, eyes open. Able to answer simple questions. No objective signs of distress noted. Patient requiring intermittent doses of morphine for his tachypnea. Tolerating PO morphine. Last VS /52, HR 64, RR 25, temp 97.9 axillary. Remains on oxygen 2 lpm per nasal cannula. Planning in place for transition to routine level of hospice care. Remains appropriate for inpatient level of hospice care while. Needs continued frequent nursing assessments for administration and titration of IV and PO comfort medications. [facility] will continue to follow this patient closely for end of life symptom management and to support family. Please call [facility] with any questions or concerns.     Mukesh Garcia RN, [address] Liaison  [phone] after hours

## 2022-10-19 NOTE — PLAN OF CARE
Assumed care at 0730. A&Ox0, open eyes, was nodding head at the beginning of the shift to yes/no questions. Received on 2L oxygen, oxygen discontinued around 1000, O2 sat dropped to 80s. O2 78, RR 34, Temp. 100. 3-ice packs applied. No tele. Writer noticed facial grimaces, tremors, tachypnea, abdominal muscles to breathe, increased oral secretions. Writer called the guardian and asked if the morphine drip can be started, guardian declined to start the morphine drip d/t his medical background. The guardian request to continue with the morphine IV push. Robinul given for secretions. Pt has a sacral wound stage 2, dressing changed. Pt has a primofit on-decrease urine output, urine is arik. Niece was present at bedside. Bed in lowest position, bed alarm on. Morphine drip started at 1mg/hr.     Problem: PAIN - ADULT  Goal: Verbalizes/displays adequate comfort level or patient's stated pain goal  Description: INTERVENTIONS:  - Encourage pt to monitor pain and request assistance  - Assess pain using appropriate pain scale  - Administer analgesics based on type and severity of pain and evaluate response  - Implement non-pharmacological measures as appropriate and evaluate response  - Consider cultural and social influences on pain and pain management  - Manage/alleviate anxiety  - Utilize distraction and/or relaxation techniques  - Monitor for opioid side effects  - Notify MD/LIP if interventions unsuccessful or patient reports new pain  - Anticipate increased pain with activity and pre-medicate as appropriate  Outcome: Progressing

## 2022-10-20 NOTE — SIGNIFICANT EVENT
[facility]    Death/Expiration note    Barrington Dupree Patient Status:  Inpatient    1935 MRN [id number]   [facility] 3NE-A Attending Liu , DO   Hosp Day # 5 PCP Troy Ferrera MD         Date and Time of Death:  10/20/22   0017    Preliminary Cause of Death  In hospice    Primary Medical Condition/Diagnosis  SOB, decreased mentation      Family notified:   Endy Romero - Dr. Freddie Johnson    Pt's death confirmed by myself and charge KORTNEY Ponce, no respirations or pulse verified.   Notified hospitalist, Dr. Angela Gamboa   All Report of Death paperwork completed

## 2023-05-17 ENCOUNTER — APPOINTMENT (OUTPATIENT)
Dept: CARDIOLOGY | Age: 88
End: 2023-05-17
Attending: INTERNAL MEDICINE

## 2023-05-17 ENCOUNTER — APPOINTMENT (OUTPATIENT)
Dept: CARDIOLOGY | Age: 88
End: 2023-05-17
